# Patient Record
Sex: MALE | Race: OTHER | HISPANIC OR LATINO | Employment: PART TIME | URBAN - METROPOLITAN AREA
[De-identification: names, ages, dates, MRNs, and addresses within clinical notes are randomized per-mention and may not be internally consistent; named-entity substitution may affect disease eponyms.]

---

## 2017-07-01 ENCOUNTER — HOSPITAL ENCOUNTER (EMERGENCY)
Facility: HOSPITAL | Age: 22
Discharge: HOME | End: 2017-07-01
Attending: EMERGENCY MEDICINE | Admitting: EMERGENCY MEDICINE
Payer: COMMERCIAL

## 2017-07-01 VITALS
DIASTOLIC BLOOD PRESSURE: 89 MMHG | WEIGHT: 173 LBS | HEART RATE: 53 BPM | TEMPERATURE: 98.7 F | RESPIRATION RATE: 16 BRPM | SYSTOLIC BLOOD PRESSURE: 151 MMHG | OXYGEN SATURATION: 99 %

## 2017-07-01 DIAGNOSIS — L03.116 LEFT LEG CELLULITIS: Primary | ICD-10-CM

## 2017-07-01 PROCEDURE — 99283 EMERGENCY DEPT VISIT LOW MDM: CPT

## 2017-07-01 RX ORDER — CEPHALEXIN 250 MG/1
500 CAPSULE ORAL ONCE
Status: COMPLETED | OUTPATIENT
Start: 2017-07-01 | End: 2017-07-01

## 2017-07-01 RX ORDER — IBUPROFEN 600 MG/1
600 TABLET ORAL ONCE
Status: COMPLETED | OUTPATIENT
Start: 2017-07-01 | End: 2017-07-01

## 2017-07-01 RX ORDER — IBUPROFEN 600 MG/1
600 TABLET ORAL EVERY 6 HOURS PRN
Qty: 20 TABLET | Refills: 0 | Status: SHIPPED | OUTPATIENT
Start: 2017-07-01

## 2017-07-01 RX ORDER — CEPHALEXIN 500 MG/1
500 CAPSULE ORAL EVERY 6 HOURS SCHEDULED
Qty: 40 CAPSULE | Refills: 0 | Status: SHIPPED | OUTPATIENT
Start: 2017-07-01 | End: 2017-07-11

## 2017-07-01 RX ADMIN — CEPHALEXIN 500 MG: 250 CAPSULE ORAL at 03:56

## 2017-07-01 RX ADMIN — IBUPROFEN 600 MG: 600 TABLET, FILM COATED ORAL at 03:56

## 2017-09-28 ENCOUNTER — OFFICE VISIT (OUTPATIENT)
Dept: URGENT CARE | Facility: MEDICAL CENTER | Age: 22
End: 2017-09-28
Payer: COMMERCIAL

## 2017-09-28 PROCEDURE — 99213 OFFICE O/P EST LOW 20 MIN: CPT

## 2017-10-27 NOTE — PROGRESS NOTES
Assessment  1  Acute bronchitis (466 0) (J20 9)    Plan  Acute bronchitis    · Benzonatate 100 MG Oral Capsule; TAKE 1 CAPSULE 3 TIMES DAILY AS NEEDED   · DrRx Zithromax Z-Alessio 250 MG #6 pill pack; 2 tablets first dose, then 1 tablet daily  for a total of 5 days    Discussion/Summary  Discussion Summary:   Rapid strep test-negative  Patient placed on Zithromax Z-Alessio, Tessalon Perles 100 milligram q 8 hours  I encouraged patient increase fluid intake  Follow-up per primary care provider symptoms worsen  Chief Complaint  Chief Complaint Free Text Note Form: Cold sx, nausea, sore throat, fever and emesis since Tuesday  History of Present Illness  HPI: Patient here with URI symptoms for the last 4-5 days  Describes having a cough which is productive of clear to whitish sputum  Denies shortness of breath  He has been taking over-the-counter DayQuil with minimal improvement along with cough drops and Tylenol cold multi symptom medication  Also describes a 4 day history of sore throat  It is scratchy in nature accompanied by low-grade fever 101 Fahrenheit yesterday  He took some Tylenol for symptoms with mild improvement  Also describes having 1 episode of vomiting diarrhea today  Denies nausea at the present time  However, recently exposed to a co-worker that bronchitis this past week  Hospital Based Practices Required Assessment:   Pain Assessment   the patient states they have pain  (on a scale of 0 to 10, the patient rates the pain at 7 ) Reason DV Screen not done: not alone       Review of Systems  Focused-Male:   Constitutional: fever  ENT: sore throat  Cardiovascular: no complaints of slow or fast heart rate, no chest pain, no palpitations, no leg claudication or lower extremity edema  Respiratory: cough, but-- no shortness of breath  Active Problems  1  Acute URI (465 9) (J06 9)   2  Nausea (787 02) (R11 0)   3  Nausea with vomiting (787 01) (R11 2)   4  Sore throat (462) (J02 9)   5   URTI (acute upper respiratory infection) (465 9) (J06 9)   6  Viral meningitis (047 9) (A87 9)    Past Medical History  1  Acute sinusitis (461 9) (J01 90)   2  History of Acute upper respiratory infection (465 9) (J06 9)   3  History of Previously Well  Active Problems And Past Medical History Reviewed: The active problems and past medical history were reviewed and updated today  Family History  Father    1  No pertinent family history  Family History    2  Family history of cardiac disorder (V17 49) (Z82 49)   3  Family history of Heart Disease (V17 49)    Social History   · Never A Smoker   · No alcohol use   · No drug use  Social History Reviewed: The social history was reviewed and updated today  The social history was reviewed and is unchanged  Surgical History  1  History of Hand Surgery    Current Meds   1  Fluticasone Propionate 50 MCG/ACT Nasal Suspension; Two sprays in each nostril   once daily; Therapy: [id number] to (Last Rx:[id number])  Requested for: 16Jnq6778 Ordered   2  Ondansetron 4 MG Oral Tablet Disintegrating; Place 1 tab under tongue q8hrs prn   nausea; Therapy: 08Whi3165 to (Last Rx:[id number])  Requested for: 93Ptb8114 Ordered  Medication List Reviewed: The medication list was reviewed and updated today  Allergies  1  No Known Drug Allergies    Vitals  Signs   Recorded: 32WGM3911 09:05PM   Temperature: 97 3 F  Heart Rate: 62  Respiration: 18  Systolic: 247  Diastolic: 64  Height: 6 ft 1 in  Weight: 175 lb   BMI Calculated: 23 09  BSA Calculated: 2 03  O2 Saturation: 98  Pain Scale: 7    Physical Exam    Constitutional   General appearance: No acute distress, well appearing and well nourished  Ears, Nose, Mouth, and Throat   External inspection of ears and nose: Normal     Otoscopic examination: Tympanic membrance translucent with normal light reflex  Canals patent without erythema  Nasal mucosa, septum, and turbinates: Abnormal  -- Hypertrophic right turbinates  Oropharynx: Abnormal  -- Cobblestoning observed  Pulmonary   Respiratory effort: No increased work of breathing or signs of respiratory distress  Auscultation of lungs: Abnormal  -- Course breath sounds bilaterally with decreased breath sounds on inspiration  Cardiovascular   Palpation of heart: Normal PMI, no thrills  Auscultation of heart: Normal rate and rhythm, normal S1 and S2, without murmurs  Lymphatic   Palpation of lymph nodes in neck: No lymphadenopathy  Results/Data  Lab Studies Reviewed: Rapid strep test-negative      Message  Return to work or school:   Yue Richards is under my professional care   He was seen in my office on 9/28/2017   He is able to return to work on  10/1/2017       Nisha Perez   Electronically signed by : Siri Goldmann, M D ; Sep 28 2017  9:24PM EST                       (Author)

## 2018-01-09 NOTE — MISCELLANEOUS
Provider Comments  Provider Comments:   NO SHOW APPT - called and spoke with family member  Apologized and will give him the message        Signatures   Electronically signed by : Christopher Bhatt DO; Jan 29 2016  4:55PM EST                       (Review)

## 2018-01-18 NOTE — MISCELLANEOUS
Message  Return to work or school:   Leopoldo Medina is under my professional care  He was seen in my office on 8/28/2016   He is able to return to work on  8/29/2016       Freya Murcia PA-C  Signatures   Electronically signed by : DADA Henderson; Aug 28 2016  1:41PM EST                       (Author)    Electronically signed by : DADA Henderson; Aug 28 2016  1:42PM EST                       (Author)    Electronically signed by : DADA Henderson; Aug 28 2016  1:44PM EST                       (Author)    Electronically signed by : DADA Henderson;  Aug 28 2016  1:45PM EST                       (Author)

## 2018-01-18 NOTE — MISCELLANEOUS
Message  Return to work or school:   Darren Parks is under my professional care   He was seen in my office on 9/28/2017   He is able to return to work on  10/1/2017       Hannah Bazzi   Electronically signed by : BONI Leone ; Sep 28 2017  9:24PM EST                       (Author)

## 2018-03-06 ENCOUNTER — OFFICE VISIT (OUTPATIENT)
Dept: URGENT CARE | Facility: MEDICAL CENTER | Age: 23
End: 2018-03-06
Payer: COMMERCIAL

## 2018-03-06 VITALS
RESPIRATION RATE: 18 BRPM | DIASTOLIC BLOOD PRESSURE: 80 MMHG | TEMPERATURE: 97.5 F | BODY MASS INDEX: 24.6 KG/M2 | OXYGEN SATURATION: 97 % | SYSTOLIC BLOOD PRESSURE: 130 MMHG | HEIGHT: 73 IN | WEIGHT: 185.6 LBS | HEART RATE: 64 BPM

## 2018-03-06 DIAGNOSIS — K52.9 NONINFECTIOUS GASTROENTERITIS, UNSPECIFIED TYPE: Primary | ICD-10-CM

## 2018-03-06 PROCEDURE — 99213 OFFICE O/P EST LOW 20 MIN: CPT | Performed by: FAMILY MEDICINE

## 2018-03-06 RX ORDER — ONDANSETRON 4 MG/1
4 TABLET, ORALLY DISINTEGRATING ORAL EVERY 6 HOURS PRN
Qty: 20 TABLET | Refills: 0 | Status: SHIPPED | OUTPATIENT
Start: 2018-03-06 | End: 2019-03-28

## 2018-03-06 NOTE — PROGRESS NOTES
[facility]        NAME: Aisha Rivera III is a 26 y o  male  : 1995    MRN: [id number]  DATE: 2018  TIME: 12:19 PM    Assessment and Plan   Noninfectious gastroenteritis, unspecified type [K52 9]  1  Noninfectious gastroenteritis, unspecified type  ondansetron (ZOFRAN-ODT) 4 mg disintegrating tablet         Patient Instructions       Follow up with PCP in 3-5 days  Proceed to  ER if symptoms worsen  Chief Complaint     Chief Complaint   Patient presents with    Abdominal Pain     for 3 days  Vomiting and diarrhea yesterday  History of Present Illness       Patient with 3 day history of abdominal pain  Pain is predominantly located in the epigastric area which is mild to moderate in nature  However in the last day he has developed some vomiting least 1 or 2 times and diarrhea which has been loose watery in nature not accompanied by any blood or mucus  He is taking no medication to control diarrhea  However he is taking some Mucinex for mild cold symptoms was consist of nasal congestion and some chest congestion  Denies any fever  Current complaint some fatigue and mild thirst       Abdominal Pain   Associated symptoms include diarrhea and vomiting  Pertinent negatives include no nausea  Review of Systems   Review of Systems   Constitutional: Positive for appetite change  HENT: Positive for congestion  Respiratory: Positive for cough  Gastrointestinal: Positive for abdominal pain, diarrhea and vomiting  Negative for nausea           Current Medications       Current Outpatient Prescriptions:     ibuprofen (MOTRIN) 600 mg tablet, Take 1 tablet by mouth every 6 (six) hours as needed for mild pain, Disp: 20 tablet, Rfl: 0    ondansetron (ZOFRAN-ODT) 4 mg disintegrating tablet, Take 1 tablet (4 mg total) by mouth every 6 (six) hours as needed for nausea or vomiting, Disp: 20 tablet, Rfl: 0    Current Allergies     Allergies as of 2018    (No Known Allergies)            The following portions of the patient's history were reviewed and updated as appropriate: allergies, current medications, past family history, past medical history, past social history, past surgical history and problem list      No past medical history on file  No past surgical history on file  No family history on file  Medications have been verified  Objective   /80 (BP Location: Left arm, Patient Position: Sitting, Cuff Size: Standard)   Pulse 64   Temp 97 5 °F (36 4 °C) (Temporal)   Resp 18   Ht 6' 1" (1 854 m)   Wt 84 2 kg (185 lb 9 6 oz)   SpO2 97%   BMI 24 49 kg/m²        Physical Exam     Physical Exam   Constitutional: He appears well-developed  Neck: Normal range of motion  Neck supple  Cardiovascular: Normal rate and regular rhythm  Pulmonary/Chest: Effort normal and breath sounds normal    Abdominal: Soft  There is tenderness  Mild tenderness on deep palpation midepigastric area with no rebound or guarding appreciated  Bowel sounds-active   Nursing note and vitals reviewed

## 2018-03-06 NOTE — PATIENT INSTRUCTIONS
I prescribed Zofran 4 mg ODT q 6h hours as needed for nausea  I advised the patient to maintain a clear liquid diet before advancing diet  He is to increase fluid intake  Recommend he use over-the-counter Pepto-Bismol or Imodium if diarrhea persists or worsens  Follow-up per primary care provider symptoms worsen  Acute Nausea and Vomiting   WHAT YOU NEED TO KNOW:   Acute nausea and vomiting start suddenly, worsen quickly, and last a short time  DISCHARGE INSTRUCTIONS:   Return to the emergency department if:   · You see blood in your vomit or your bowel movements  · You have sudden, severe pain in your chest and upper abdomen after hard vomiting or retching  · You have swelling in your neck and chest      · You are dizzy, cold, and thirsty and your eyes and mouth are dry  · You are urinating very little or not at all  · You have muscle weakness, leg cramps, and trouble breathing  · Your heart is beating much faster than normal      · You continue to vomit for more than 48 hours  Contact your healthcare provider if:   · You have frequent dry heaves (vomiting but nothing comes out)  · Your nausea and vomiting does not get better or go away after you use medicine  · You have questions or concerns about your condition or treatment  Medicines: You may need any of the following:  · Medicines  may be given to calm your stomach and stop your vomiting  You may also need medicines to help you feel more relaxed or to stop nausea and vomiting caused by motion sickness  · Gastrointestinal stimulants  are used to help empty your stomach and bowels  This may help decrease nausea and vomiting  · Take your medicine as directed  Contact your healthcare provider if you think your medicine is not helping or if you have side effects  Tell him or her if you are allergic to any medicine  Keep a list of the medicines, vitamins, and herbs you take   Include the amounts, and when and why you take them  Bring the list or the pill bottles to follow-up visits  Carry your medicine list with you in case of an emergency  Prevent or manage acute nausea and vomiting:   · Do not drink alcohol  Alcohol may upset or irritate your stomach  Too much alcohol can also cause acute nausea and vomiting  · Control stress  Headaches due to stress may cause nausea and vomiting  Find ways to relax and manage your stress  Get more rest and sleep  · Drink more liquids as directed  Vomiting can lead to dehydration  It is important to drink more liquids to help replace lost body fluids  Ask your healthcare provider how much liquid to drink each day and which liquids are best for you  Your provider may recommend that you drink an oral rehydration solution (ORS)  ORS contains water, salts, and sugar that are needed to replace the lost body fluids  Ask what kind of ORS to use, how much to drink, and where to get it  · Eat smaller meals, more often  Eat small amounts of food every 2 to 3 hours, even if you are not hungry  Food in your stomach may decrease your nausea  · Talk to your healthcare provider before you take over-the-counter (OTC) medicines  These medicines can cause serious problems if you use certain other medicines, or you have a medical condition  You may have problems if you use too much or use them for longer than the label says  Follow directions on the label carefully  Follow up with your healthcare provider as directed:  Write down your questions so you remember to ask them during your follow-up visits  © 2017 [facility] [place] [employer] Information is for End User's use only and may not be sold, redistributed or otherwise used for commercial purposes  All illustrations and images included in CareNotes® are the copyrighted property of [email or web address] , [employer]  or [employer]  The above information is an  only   It is not intended as medical advice for individual conditions or treatments  Talk to your doctor, nurse or pharmacist before following any medical regimen to see if it is safe and effective for you

## 2018-03-06 NOTE — LETTER
March 6, 2018     Patient: Keyla Paul III   YOB: 1995   Date of Visit: 3/6/2018       To Whom it May Concern:    Keyla Paul III was seen in my clinic on 3/6/2018  He may return to school on 3/8/2018  If you have any questions or concerns, please don't hesitate to call           Sincerely,          Hortencia Padron MD        CC: No Recipients

## 2018-08-11 ENCOUNTER — OFFICE VISIT (OUTPATIENT)
Dept: URGENT CARE | Facility: MEDICAL CENTER | Age: 23
End: 2018-08-11
Payer: COMMERCIAL

## 2018-08-11 VITALS
SYSTOLIC BLOOD PRESSURE: 133 MMHG | DIASTOLIC BLOOD PRESSURE: 80 MMHG | HEIGHT: 73 IN | WEIGHT: 177 LBS | TEMPERATURE: 97.8 F | OXYGEN SATURATION: 98 % | HEART RATE: 60 BPM | RESPIRATION RATE: 16 BRPM | BODY MASS INDEX: 23.46 KG/M2
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
## 2018-08-11 PROCEDURE — 99213 OFFICE O/P EST LOW 20 MIN: CPT | Performed by: FAMILY MEDICINE

## 2018-08-11 RX ORDER — MOMETASONE FUROATE 1 MG/G
CREAM TOPICAL DAILY
Qty: 15 G | Refills: 0 | Status: SHIPPED | OUTPATIENT
Start: 2018-08-11 | End: 2019-03-28

## 2018-08-11 NOTE — PATIENT INSTRUCTIONS
I prescribed generic Elocon cream to be applied to affected eye at least once a day  May continue ice application  Patient is to follow-up per primary care provider redness or swelling worsens  Insect Bite or Sting   WHAT YOU NEED TO KNOW:   Most insect bites and stings are not dangerous and go away without treatment  Your symptoms may be mild, or you may develop anaphylaxis  Anaphylaxis is a sudden, life-threatening reaction that needs immediate treatment  Common examples of insects that bite or sting are bees, ticks, mosquitoes, spiders, and ants  Insect bites or stings can lead to diseases such as malaria, West Nile virus, Lyme disease, or [place] Spotted Fever  DISCHARGE INSTRUCTIONS:   Call 911 for signs or symptoms of anaphylaxis,  such as trouble breathing, swelling in your mouth or throat, or wheezing  You may also have itching, a rash, hives, or feel like you are going to faint  Return to the emergency department if:   · You are stung on your tongue or in your throat  · A white area forms around the bite  · You are sweating badly or have body pain  · You think you were bitten or stung by a poisonous insect  Contact your healthcare provider if:   · You have a fever  · The area becomes red, warm, tender, and swollen beyond the area of the bite or sting  · You have questions or concerns about your condition or care  Medicines:   · Antihistamines  decrease itching and rash  · Epinephrine  is used to treat severe allergic reactions such as anaphylaxis  · Take your medicine as directed  Contact your healthcare provider if you think your medicine is not helping or if you have side effects  Tell him of her if you are allergic to any medicine  Keep a list of the medicines, vitamins, and herbs you take  Include the amounts, and when and why you take them  Bring the list or the pill bottles to follow-up visits  Carry your medicine list with you in case of an emergency    Steps to take for signs or symptoms of anaphylaxis:   · Immediately  give 1 shot of epinephrine only into the outer thigh muscle  · Leave the shot in place  as directed  Your healthcare provider may recommend you leave it in place for up to 10 seconds before you remove it  This helps make sure all of the epinephrine is delivered  · Call 911 and go to the emergency department,  even if the shot improved symptoms  Do not drive yourself  Bring the used epinephrine shot with you  Safety precautions to take if you are at risk for anaphylaxis:   · Keep 2 shots of epinephrine with you at all times  You may need a second shot, because epinephrine only works for about 20 minutes and symptoms may return  Your healthcare provider can show you and family members how to give the shot  Check the expiration date every month and replace it before it expires  · Create an action plan  Your healthcare provider can help you create a written plan that explains the allergy and an emergency plan to treat a reaction  The plan explains when to give a second epinephrine shot if symptoms return or do not improve after the first  Give copies of the action plan and emergency instructions to family members, work and school staff, and  providers  Show them how to give a shot of epinephrine  · Carry medical alert identification  Wear medical alert jewelry or carry a card that says you have an insect allergy  Ask your healthcare provider where to get these items  If an insect bites or stings you:   · Remove the stinger  Scrape the stinger out with your fingernail, edge of a credit card, or a knife blade  Do not squeeze the wound  Gently wash the area with soap and water  · Remove the tick  Ticks must be removed as soon as possible so you do not get diseases passed through tick bites  Ask your healthcare provider for more information on tick bites and how to remove ticks    Care for a bite or sting wound:   · Elevate the affected area  Prop the wound above the level of your heart, if possible  Elevate the area for 10 to 20 minutes each hour or as directed by your healthcare provider  · Use compresses  Soak a clean washcloth in cold water, wring it out, and put it on the bite or sting  Use the compress for 10 to 20 minutes each hour or as directed by your healthcare provider  After 24 to 48 hours, change to warm compresses  · Apply a paste  Add water to baking soda to make a thick paste  Put the paste on the area for 5 minutes  Rinse gently to remove the paste  Prevent another insect bite or sting:   · Do not wear bright-colored or flower-print clothing when you plan to spend time outdoors  Do not use hairspray, perfumes, or aftershave  · Do not leave food out  · Empty any standing water and wash container with soap and water every 2 days  · Put screens on all open windows and doors  · Put insect repellent that contains DEET on skin that is showing when you go outside  Put insect repellent at the top of your boots, bottom of pant legs, and sleeve cuffs  Wear long sleeves, pants, and shoes  · Use citronella candles outdoors to help keep mosquitoes away  Put a tick and flea collar on pets  Follow up with your healthcare provider as directed:  Write down your questions so you remember to ask them during your visits  © 2017 [address] Information is for End User's use only and may not be sold, redistributed or otherwise used for commercial purposes  All illustrations and images included in CareNotes® are the copyrighted property of [employer]  or [address]  The above information is an  only  It is not intended as medical advice for individual conditions or treatments  Talk to your doctor, nurse or pharmacist before following any medical regimen to see if it is safe and effective for you

## 2018-08-11 NOTE — PROGRESS NOTES
[facility]        NAME: Sharona Mehta III is a 26 y o  male  : 1995    MRN: [id number]  DATE: 2018  TIME: 11:32 AM    Assessment and Plan   Insect bite, initial encounter [W57  XXXA]  1  Insect bite, initial encounter  mometasone (ELOCON) 0 1 % cream         Patient Instructions       Follow up with PCP in 3-5 days  Proceed to  ER if symptoms worsen  Chief Complaint     Chief Complaint   Patient presents with    Eye Pain     insect bit 4 days - has redness and itching around the eye area          History of Present Illness       Patient complaining of insect bite underneath his right eye which occurred approximately 4 days ago  Not quite sure what bit him  However 3 days ago noticed some redness and swelling underneath his right eyelid  Describes minimal itching  He has been applying ice with no noticeable improvement  Denies fever  Denies eye pain  Review of Systems   Review of Systems   Constitutional: Negative  Skin: Positive for rash  All other systems reviewed and are negative  Current Medications       Current Outpatient Prescriptions:     ibuprofen (MOTRIN) 600 mg tablet, Take 1 tablet by mouth every 6 (six) hours as needed for mild pain, Disp: 20 tablet, Rfl: 0    mometasone (ELOCON) 0 1 % cream, Apply topically daily for 7 days, Disp: 15 g, Rfl: 0    ondansetron (ZOFRAN-ODT) 4 mg disintegrating tablet, Take 1 tablet (4 mg total) by mouth every 6 (six) hours as needed for nausea or vomiting, Disp: 20 tablet, Rfl: 0    Current Allergies     Allergies as of 2018    (No Known Allergies)            The following portions of the patient's history were reviewed and updated as appropriate: allergies, current medications, past family history, past medical history, past social history, past surgical history and problem list      No past medical history on file  No past surgical history on file  No family history on file        Medications have been verified  Objective   /80 (BP Location: Left arm, Patient Position: Sitting, Cuff Size: Standard)   Pulse 60   Temp 97 8 °F (36 6 °C)   Resp 16   Ht 6' 1" (1 854 m)   Wt 80 3 kg (177 lb)   SpO2 98%   BMI 23 35 kg/m²        Physical Exam     Physical Exam   Skin:   Right lower eyelid reveals some erythema  Area of erythema measures approximately 1 x 2 cm  There is no induration  Nontender to touch  Nursing note and vitals reviewed

## 2018-08-11 NOTE — LETTER
August 11, 2018     Patient: Madeline Preston III   YOB: 1995   Date of Visit: 8/11/2018       To Whom It May Concern: It is my medical opinion that Madeline Preston III may return to work on 8/11/2018  If you have any questions or concerns, please don't hesitate to call           Sincerely,        Otto Tomas MD    CC: No Recipients

## 2019-03-28 ENCOUNTER — OFFICE VISIT (OUTPATIENT)
Dept: FAMILY MEDICINE CLINIC | Facility: CLINIC | Age: 24
End: 2019-03-28
Payer: COMMERCIAL

## 2019-03-28 VITALS
HEIGHT: 73 IN | WEIGHT: 182 LBS | TEMPERATURE: 98.2 F | SYSTOLIC BLOOD PRESSURE: 128 MMHG | RESPIRATION RATE: 18 BRPM | HEART RATE: 62 BPM | BODY MASS INDEX: 24.12 KG/M2 | DIASTOLIC BLOOD PRESSURE: 76 MMHG

## 2019-03-28 DIAGNOSIS — R06.83 SNORING: ICD-10-CM

## 2019-03-28 DIAGNOSIS — G47.9 SLEEPING DIFFICULTY: ICD-10-CM

## 2019-03-28 DIAGNOSIS — R06.81 WITNESSED APNEIC SPELLS: ICD-10-CM

## 2019-03-28 DIAGNOSIS — Z80.8 FAMILY HISTORY OF THYROID CANCER: ICD-10-CM

## 2019-03-28 DIAGNOSIS — Z13.6 SCREENING FOR CARDIOVASCULAR CONDITION: ICD-10-CM

## 2019-03-28 DIAGNOSIS — Z00.00 ANNUAL PHYSICAL EXAM: Primary | ICD-10-CM

## 2019-03-28 DIAGNOSIS — Z23 NEED FOR VACCINATION: ICD-10-CM

## 2019-03-28 PROCEDURE — 90471 IMMUNIZATION ADMIN: CPT

## 2019-03-28 PROCEDURE — 90715 TDAP VACCINE 7 YRS/> IM: CPT

## 2019-03-28 PROCEDURE — 99385 PREV VISIT NEW AGE 18-39: CPT | Performed by: NURSE PRACTITIONER

## 2019-03-28 RX ORDER — MULTIVITAMIN
1 TABLET ORAL DAILY
COMMUNITY

## 2019-03-28 NOTE — PROGRESS NOTES
[facility] HEALTH MAINTENANCE OFFICE VISIT  [facility]    NAME: Zackery Contreras III  AGE: 27 y o  SEX: male  : 1995     DATE: 3/28/2019    Assessment and Plan         Will get blood work done and will call with results  Diagnoses and all orders for this visit:    Annual physical exam  -     Comprehensive metabolic panel; Future  -     CBC and differential; Future  -     Lipid Panel with Direct LDL reflex; Future  -     TSH, 3rd generation with Free T4 reflex; Future    Snoring  -     Comprehensive metabolic panel; Future  -     CBC and differential; Future  -     Lipid Panel with Direct LDL reflex; Future  -     TSH, 3rd generation with Free T4 reflex; Future  -     Ambulatory referral to Pulmonology; Future  -     Ambulatory referral to Sleep Medicine; Future    Sleeping difficulty  -     Comprehensive metabolic panel; Future  -     CBC and differential; Future  -     Lipid Panel with Direct LDL reflex; Future  -     TSH, 3rd generation with Free T4 reflex; Future  -     Ambulatory referral to Pulmonology; Future  -     Ambulatory referral to Sleep Medicine; Future    Witnessed apneic spells  -     Comprehensive metabolic panel; Future  -     CBC and differential; Future  -     Lipid Panel with Direct LDL reflex; Future  -     TSH, 3rd generation with Free T4 reflex; Future  -     Ambulatory referral to Pulmonology; Future  -     Ambulatory referral to Sleep Medicine; Future    Screening for cardiovascular condition  -     Comprehensive metabolic panel; Future  -     Lipid Panel with Direct LDL reflex; Future    Family history of thyroid cancer  -     Comprehensive metabolic panel; Future  -     CBC and differential; Future  -     Lipid Panel with Direct LDL reflex; Future  -     TSH, 3rd generation with Free T4 reflex;  Future    Need for vaccination  -     TDAP VACCINE GREATER THAN OR EQUAL TO 8YO IM    Other orders  -     Multiple Vitamin (MULTIVITAMIN) tablet; Take 1 tablet by mouth daily            · Patient Counseling:   · Nutrition: Stressed importance of a well balanced diet, moderation of sodium/saturated fat, caloric balance and sufficient intake of fiber  · Exercise: Stressed the importance of regular exercise with a goal of 150 minutes per week  · Dental Health: Discussed daily flossing and brushing and regular dental visits   · Sexuality: Discussed sexually transmitted infections, use of condoms and prevention of unintended pregnancy  · Alcohol Use:  Recommended moderation of alcohol intake  · Injury Prevention: Discussed Safety Belts, Safety Helmets, and Smoke Detectors    · Immunizations reviewed and administered  · Discussed benefits of screening of self testicular exam and skin exam  BMI Counseling: Body mass index is 24 01 kg/m²  Discussed with patient's BMI with him  Return in about 1 year (around 3/28/2020) for Annual physical         Chief Complaint     Chief Complaint   Patient presents with    Physical Exam     klpn    sleep issues       History of Present Illness     HPI    Well Adult Physical   Patient here for a comprehensive physical exam   New to practice  Personal and family medical history reviewed  Stated that having sleeping issues from while where he does not sleep for 24 to 48 hours and then crashes              Diet and Physical Activity  Diet: well balanced diet  Weight concerns: None, patient's BMI is between 18 5-24 9  Exercise: daily      Depression Screen  PHQ-9 Depression Screening    PHQ-9:    Frequency of the following problems over the past two weeks:       Little interest or pleasure in doing things:  0 - not at all  Feeling down, depressed, or hopeless:  0 - not at all  PHQ-2 Score:  0          General Health  Hearing: Normal:  bilateral  Vision: no vision problems, most recent eye exam >1 year and wears contacts  Dental: regular dental visits    Reproductive Health  Sexually active with female partners and uses OCP and condoms      The following portions of the patient's history were reviewed and updated as appropriate: allergies, current medications, past family history, past medical history, past social history, past surgical history and problem list     Review of Systems     Review of Systems   Constitutional: Negative  HENT: Negative  Eyes: Negative  Respiratory: Negative for cough, choking, chest tightness, shortness of breath, wheezing and stridor  As noted in HPI     Cardiovascular: Negative  Gastrointestinal: Negative  Endocrine: Negative  Genitourinary: Negative  Musculoskeletal: Negative  Skin: Negative  Allergic/Immunologic: Negative  Neurological: Negative  Hematological: Negative  Psychiatric/Behavioral: Positive for sleep disturbance  Negative for agitation, behavioral problems, confusion, decreased concentration, dysphoric mood, hallucinations, self-injury and suicidal ideas  The patient is not nervous/anxious and is not hyperactive          Past Medical History     Past Medical History:   Diagnosis Date    Medical history non-contributory        Past Surgical History     Past Surgical History:   Procedure Laterality Date    HAND SURGERY      NO PAST SURGERIES         Social History     Social History     Socioeconomic History    Marital status: Single     Spouse name: None    Number of children: None    Years of education: None    Highest education level: None   Occupational History    None   Social Needs    Financial resource strain: None    Food insecurity:     Worry: None     Inability: None    Transportation needs:     Medical: None     Non-medical: None   Tobacco Use    Smoking status: Never Smoker    Smokeless tobacco: Never Used   Substance and Sexual Activity    Alcohol use: Yes     Frequency: 2-3 times a week     Drinks per session: 1 or 2     Binge frequency: Never    Drug use: No    Sexual activity: None   Lifestyle    Physical activity: Days per week: None     Minutes per session: None    Stress: None   Relationships    Social connections:     Talks on phone: None     Gets together: None     Attends Holiness service: None     Active member of club or organization: None     Attends meetings of clubs or organizations: None     Relationship status: None    Intimate partner violence:     Fear of current or ex partner: None     Emotionally abused: None     Physically abused: None     Forced sexual activity: None   Other Topics Concern    None   Social History Narrative    None       Family History     Family History   Problem Relation Age of Onset    No Known Problems Father     Heart disease Family     Thyroid cancer Sister     Heart disease Maternal Grandmother     Heart disease Maternal Grandfather        Current Medications       Current Outpatient Medications:     ibuprofen (MOTRIN) 600 mg tablet, Take 1 tablet by mouth every 6 (six) hours as needed for mild pain, Disp: 20 tablet, Rfl: 0    Multiple Vitamin (MULTIVITAMIN) tablet, Take 1 tablet by mouth daily, Disp: , Rfl:      Allergies     No Known Allergies    Objective     /76   Pulse 62   Temp 98 2 °F (36 8 °C)   Resp 18   Ht 6' 1" (1 854 m)   Wt 82 6 kg (182 lb)   BMI 24 01 kg/m²      Physical Exam   Constitutional: He is oriented to person, place, and time  He appears well-developed and well-nourished  HENT:   Head: Normocephalic  Right Ear: Tympanic membrane, external ear and ear canal normal    Left Ear: Tympanic membrane, external ear and ear canal normal    Nose: Nose normal  Right sinus exhibits no maxillary sinus tenderness and no frontal sinus tenderness  Left sinus exhibits no maxillary sinus tenderness and no frontal sinus tenderness  Mouth/Throat: Oropharynx is clear and moist and mucous membranes are normal    Eyes: Pupils are equal, round, and reactive to light  Conjunctivae and lids are normal    Neck: Normal range of motion  Neck supple  Cardiovascular: Normal rate, regular rhythm and normal heart sounds  Pulmonary/Chest: Effort normal and breath sounds normal    Abdominal: Soft  Normal appearance and bowel sounds are normal  There is no hepatomegaly  There is no tenderness  There is no rebound and no CVA tenderness  Hernia confirmed negative in the right inguinal area and confirmed negative in the left inguinal area  Genitourinary:   Genitourinary Comments: Refused  exam   Musculoskeletal: Normal range of motion  He exhibits no edema, tenderness or deformity  Lymphadenopathy:        Right cervical: No superficial cervical and no posterior cervical adenopathy present  Left cervical: No superficial cervical and no posterior cervical adenopathy present  Right: No inguinal and no supraclavicular adenopathy present  Left: No inguinal and no supraclavicular adenopathy present  Neurological: He is alert and oriented to person, place, and time  He has normal strength and normal reflexes  No sensory deficit  Coordination and gait normal  GCS eye subscore is 4  GCS verbal subscore is 5  GCS motor subscore is 6  Skin: Skin is warm, dry and intact  Psychiatric: He has a normal mood and affect  His speech is normal and behavior is normal  Judgment and thought content normal  Cognition and memory are normal    Vitals reviewed           Visual Acuity Screening    Right eye Left eye Both eyes   Without correction:      With correction: 20/15 20/20 20/13       Health Maintenance     Health Maintenance   Topic Date Due    Depression Screening PHQ  03/28/2020    BMI: Adult  03/28/2020    DTaP,Tdap,and Td Vaccines (7 - Td) 03/28/2029    INFLUENZA VACCINE  Completed    HEPATITIS B VACCINES  Completed     Immunization History   Administered Date(s) Administered    DTaP 5 1997, 03/25/1999, 06/17/2002, 07/28/2003    H1N1, All Formulations 10/23/2009    HPV Quadrivalent 05/08/2014    Hep B, adult 06/17/2002, 07/23/2002, 02/01/2003    INFLUENZA 01/05/2019    IPV 1997, 03/25/1999, 06/17/2002, 07/28/2003    Influenza TIV (IM) 11/14/2008, 10/20/2009    MMR 06/17/2002, 11/20/2002    Meningococcal, Unknown Serogroups 11/14/2008, 05/08/2014    Tdap 11/14/2008, 03/28/2019    Varicella 06/17/2002, 11/14/2008       [facility], [address] [facility]

## 2019-03-28 NOTE — PATIENT INSTRUCTIONS
Wellness Visit for Adults   WHAT YOU NEED TO KNOW:   What is a wellness visit? A wellness visit is when you see your healthcare provider to get screened for health problems  You can also get advice on how to stay healthy  Write down your questions so you remember to ask them  Ask your healthcare provider how often you should have a wellness visit  What happens at a wellness visit? Your healthcare provider will ask about your health, and your family history of health problems  This includes high blood pressure, heart disease, and cancer  He or she will ask if you have symptoms that concern you, if you smoke, and about your mood  You may also be asked about your intake of medicines, supplements, food, and alcohol  Any of the following may be done:  · Your weight  will be checked  Your height may also be checked so your body mass index (BMI) can be calculated  Your BMI shows if you are at a healthy weight  · Your blood pressure  and heart rate will be checked  Your temperature may also be checked  · Blood and urine tests  may be done  Blood tests may be done to check your cholesterol levels  Abnormal cholesterol levels increase your risk for heart disease and stroke  You may also need a blood or urine test to check for diabetes if you are at increased risk  Urine tests may be done to look for signs of an infection or kidney disease  · A physical exam  includes checking your heartbeat and lungs with a stethoscope  Your healthcare provider may also check your skin to look for sun damage  · Screening tests  may be recommended  A screening test is done to check for diseases that may not cause symptoms  The screening tests you may need depend on your age, gender, family history, and lifestyle habits  For example, colorectal screening may be recommended if you are 48years old or older  What screening tests do I need if I am a woman? · A Pap smear  is used to screen for cervical cancer   Pap smears are usually done every 3 to 5 years depending on your age  You may need them more often if you have had abnormal Pap smear test results in the past  Ask your healthcare provider how often you should have a Pap smear  · A mammogram  is an x-ray of your breasts to screen for breast cancer  Experts recommend mammograms every 2 years starting at age 48 years  You may need a mammogram at age 52 years or younger if you have an increased risk for breast cancer  Talk to your healthcare provider about when you should start having mammograms and how often you need them  What vaccines might I need? · Get an influenza vaccine  every year  The influenza vaccine protects you from the flu  Several types of viruses cause the flu  The viruses change over time, so new vaccines are made each year  · Get a tetanus-diphtheria (Td) booster vaccine  every 10 years  This vaccine protects you against tetanus and diphtheria  Tetanus is a severe infection that may cause painful muscle spasms and lockjaw  Diphtheria is a severe bacterial infection that causes a thick covering in the back of your mouth and throat  · Get a human papillomavirus (HPV) vaccine  if you are female and aged 23 to 32 or male 23 to 24 and never received it  This vaccine protects you from HPV infection  HPV is the most common infection spread by sexual contact  HPV may also cause vaginal, penile, and anal cancers  · Get a pneumococcal vaccine  if you are aged 72 years or older  The pneumococcal vaccine is an injection given to protect you from pneumococcal disease  Pneumococcal disease is an infection caused by pneumococcal bacteria  The infection may cause pneumonia, meningitis, or an ear infection  · Get a shingles vaccine  if you are aged 61 or older, even if you have had shingles before  The shingles vaccine is an injection to protect you from the varicella-zoster virus  This is the same virus that causes chickenpox   Shingles is a painful rash that develops in people who had chickenpox or have been exposed to the virus  How can I eat healthy? My Plate is a model for planning healthy meals  It shows the types and amounts of foods that should go on your plate  Fruits and vegetables make up about half of your plate, and grains and protein make up the other half  A serving of dairy is included on the side of your plate  The amount of calories and serving sizes you need depends on your age, gender, weight, and height  Examples of healthy foods are listed below:  · Eat a variety of vegetables  such as dark green, red, and orange vegetables  You can also include canned vegetables low in sodium (salt) and frozen vegetables without added butter or sauces  · Eat a variety of fresh fruits , canned fruit in 100% juice, frozen fruit, and dried fruit  · Include whole grains  At least half of the grains you eat should be whole grains  Examples include whole-wheat bread, wheat pasta, brown rice, and whole-grain cereals such as oatmeal     · Eat a variety of protein foods such as seafood (fish and shellfish), lean meat, and poultry without skin (turkey and chicken)  Examples of lean meats include pork leg, shoulder, or tenderloin, and beef round, sirloin, tenderloin, and extra lean ground beef  Other protein foods include eggs and egg substitutes, beans, peas, soy products, nuts, and seeds  · Choose low-fat dairy products such as skim or 1% milk or low-fat yogurt, cheese, and cottage cheese  · Limit unhealthy fats  such as butter, hard margarine, and shortening  How much exercise do I need? Exercise at least 30 minutes per day on most days of the week  Some examples of exercise include walking, biking, dancing, and swimming  You can also fit in more physical activity by taking the stairs instead of the elevator or parking farther away from stores  Include muscle strengthening activities 2 days each week  Regular exercise provides many health benefits  It helps you manage your weight, and decreases your risk for type 2 diabetes, heart disease, stroke, and high blood pressure  Exercise can also help improve your mood  Ask your healthcare provider about the best exercise plan for you  What are some general health and safety guidelines I should follow? · Do not smoke  Nicotine and other chemicals in cigarettes and cigars can cause lung damage  Ask your healthcare provider for information if you currently smoke and need help to quit  E-cigarettes or smokeless tobacco still contain nicotine  Talk to your healthcare provider before you use these products  · Limit alcohol  A drink of alcohol is 12 ounces of beer, 5 ounces of wine, or 1½ ounces of liquor  · Lose weight, if needed  Being overweight increases your risk of certain health conditions  These include heart disease, high blood pressure, type 2 diabetes, and certain types of cancer  · Protect your skin  Do not sunbathe or use tanning beds  Use sunscreen with a SPF 15 or higher  Apply sunscreen at least 15 minutes before you go outside  Reapply sunscreen every 2 hours  Wear protective clothing, hats, and sunglasses when you are outside  · Drive safely  Always wear your seatbelt  Make sure everyone in your car wears a seatbelt  A seatbelt can save your life if you are in an accident  Do not use your cell phone when you are driving  This could distract you and cause an accident  Pull over if you need to make a call or send a text message  · Practice safe sex  Use latex condoms if are sexually active and have more than one partner  Your healthcare provider may recommend screening tests for sexually transmitted infections (STIs)  · Wear helmets, lifejackets, and protective gear  Always wear a helmet when you ride a bike or motorcycle, go skiing, or play sports that could cause a head injury  Wear protective equipment when you play sports   Wear a lifejacket when you are on a boat or doing water sports  CARE AGREEMENT:   You have the right to help plan your care  Learn about your health condition and how it may be treated  Discuss treatment options with your caregivers to decide what care you want to receive  You always have the right to refuse treatment  The above information is an  only  It is not intended as medical advice for individual conditions or treatments  Talk to your doctor, nurse or pharmacist before following any medical regimen to see if it is safe and effective for you  © 2017 [employer] [employer] Information is for End User's use only and may not be sold, redistributed or otherwise used for commercial purposes  All illustrations and images included in CareNotes® are the copyrighted property of [employer]  or [employer]

## 2020-01-16 ENCOUNTER — APPOINTMENT (OUTPATIENT)
Dept: RADIOLOGY | Facility: MEDICAL CENTER | Age: 25
End: 2020-01-16
Payer: COMMERCIAL

## 2020-01-16 ENCOUNTER — OFFICE VISIT (OUTPATIENT)
Dept: URGENT CARE | Facility: MEDICAL CENTER | Age: 25
End: 2020-01-16
Payer: COMMERCIAL

## 2020-01-16 VITALS
RESPIRATION RATE: 18 BRPM | DIASTOLIC BLOOD PRESSURE: 78 MMHG | HEIGHT: 73 IN | TEMPERATURE: 98.5 F | OXYGEN SATURATION: 96 % | BODY MASS INDEX: 25.22 KG/M2 | SYSTOLIC BLOOD PRESSURE: 137 MMHG | HEART RATE: 70 BPM | WEIGHT: 190.26 LBS

## 2020-01-16 DIAGNOSIS — M25.511 RIGHT SHOULDER PAIN, UNSPECIFIED CHRONICITY: ICD-10-CM

## 2020-01-16 DIAGNOSIS — M25.511 RIGHT SHOULDER PAIN, UNSPECIFIED CHRONICITY: Primary | ICD-10-CM

## 2020-01-16 PROCEDURE — 73030 X-RAY EXAM OF SHOULDER: CPT

## 2020-01-16 PROCEDURE — 99213 OFFICE O/P EST LOW 20 MIN: CPT | Performed by: FAMILY MEDICINE

## 2020-01-17 ENCOUNTER — OFFICE VISIT (OUTPATIENT)
Dept: OBGYN CLINIC | Facility: MEDICAL CENTER | Age: 25
End: 2020-01-17
Payer: COMMERCIAL

## 2020-01-17 VITALS
HEIGHT: 73 IN | BODY MASS INDEX: 24.52 KG/M2 | DIASTOLIC BLOOD PRESSURE: 82 MMHG | WEIGHT: 185 LBS | SYSTOLIC BLOOD PRESSURE: 153 MMHG | HEART RATE: 61 BPM

## 2020-01-17 DIAGNOSIS — M25.511 RIGHT SHOULDER PAIN, UNSPECIFIED CHRONICITY: ICD-10-CM

## 2020-01-17 DIAGNOSIS — S46.911A STRAIN OF ACROMIOCLAVICULAR JOINT, RIGHT, INITIAL ENCOUNTER: Primary | ICD-10-CM

## 2020-01-17 PROCEDURE — 99203 OFFICE O/P NEW LOW 30 MIN: CPT | Performed by: EMERGENCY MEDICINE

## 2020-01-17 NOTE — PROGRESS NOTES
Assessment/Plan:    Diagnoses and all orders for this visit:    Strain of acromioclavicular joint, right, initial encounter  -     Ambulatory referral to Physical Therapy; Future    Right shoulder pain, unspecified chronicity  -     Ambulatory referral to Sports Medicine  -     Ambulatory referral to Physical Therapy; Future      I believe the patient has sustained sprain of the [facility] joint based on history and physical exam   However there is some concern for possible labral involvement  Will recommend physical therapy continue ice and NSAIDs  Patient does live in [place] working for [employer] and is a will be returning there shortly  He will be coming back up to [place] in February for re-evaluation  To consider MR arthrogram if continued symptoms  We have provided a note for     Return in about 4 weeks (around 2/14/2020)  Chief Complaint:     Chief Complaint   Patient presents with    Right Shoulder - Pain       Subjective:   Patient ID: Sean Roche III is a 27 y o  male  NP presents for several months of right shoulder sharp pain superior over the trap worse with reaching and lifting  Occurring after falling while running landing on outstretched arms  The patient is in the [employer] [facility] and has been having difficulty of the right shoulder with his exercise and responsibilities  He is scheduled to go to training in March for which she will be required to do in exercise assessment test including running sit ups and pushups  Pain ranges from 1-8/10  Denies any sense of instability but while doing pushups does experience some popping of the shoulder sometimes painful  Denies any numbness tingling  Review of Systems   Constitutional: Negative for chills and fever  HENT: Negative for sore throat and trouble swallowing  Eyes: Negative for pain and discharge  Respiratory: Negative for choking and shortness of breath      Cardiovascular: Negative for chest pain and palpitations  Gastrointestinal: Negative for abdominal pain and vomiting  Endocrine: Negative for cold intolerance and heat intolerance  Musculoskeletal: Positive for arthralgias  Neurological: Negative for dizziness and weakness  Psychiatric/Behavioral: Negative for self-injury and suicidal ideas  The following portions of the patient's chart were reviewed and updated as appropriate:    Allergy:  No Known Allergies      Past Medical History:   Diagnosis Date    Medical history non-contributory        Past Surgical History:   Procedure Laterality Date    HAND SURGERY      NO PAST SURGERIES         Social History     Socioeconomic History    Marital status: Single     Spouse name: Not on file    Number of children: Not on file    Years of education: Not on file    Highest education level: Not on file   Occupational History    Not on file   Social Needs    Financial resource strain: Not on file    Food insecurity:     Worry: Not on file     Inability: Not on file    Transportation needs:     Medical: Not on file     Non-medical: Not on file   Tobacco Use    Smoking status: Never Smoker    Smokeless tobacco: Never Used   Substance and Sexual Activity    Alcohol use: Not Currently     Frequency: 2-3 times a week     Drinks per session: 1 or 2     Binge frequency: Never    Drug use: No    Sexual activity: Not on file   Lifestyle    Physical activity:     Days per week: Not on file     Minutes per session: Not on file    Stress: Not on file   Relationships    Social connections:     Talks on phone: Not on file     Gets together: Not on file     Attends Christianity service: Not on file     Active member of club or organization: Not on file     Attends meetings of clubs or organizations: Not on file     Relationship status: Not on file    Intimate partner violence:     Fear of current or ex partner: Not on file     Emotionally abused: Not on file     Physically abused: Not on file     Forced sexual activity: Not on file   Other Topics Concern    Not on file   Social History Narrative    Not on file       Family History   Problem Relation Age of Onset    No Known Problems Father     Heart disease Family     Thyroid cancer Sister     Heart disease Maternal Grandmother     Heart disease Maternal Grandfather        Medications:    Current Outpatient Medications:     ibuprofen (MOTRIN) 600 mg tablet, Take 1 tablet by mouth every 6 (six) hours as needed for mild pain, Disp: 20 tablet, Rfl: 0    Multiple Vitamin (MULTIVITAMIN) tablet, Take 1 tablet by mouth daily, Disp: , Rfl:     Patient Active Problem List   Diagnosis    Viral meningitis       Objective:  /82   Pulse 61   Ht 6' 1" (1 854 m)   Wt 83 9 kg (185 lb)   BMI 24 41 kg/m²     Right Shoulder Exam     Tenderness   The patient is experiencing tenderness in the acromioclavicular joint  Range of Motion   Active abduction: abnormal   External rotation: normal   Internal rotation 0 degrees: normal     Muscle Strength   External rotation: 5/5   Supraspinatus: 5/5     Tests   Cross arm: positive  Drop arm: negative    Other   Erythema: absent  Sensation: normal    Comments:  + AC test      Left Shoulder Exam     Range of Motion   Active abduction: normal   External rotation: normal   Internal rotation 0 degrees: normal     Muscle Strength   External rotation: 5/5   Supraspinatus: 5/5             Physical Exam   Constitutional: He is oriented to person, place, and time  He appears well-developed and well-nourished  HENT:   Head: Normocephalic and atraumatic  Eyes: Conjunctivae are normal    Neck: Neck supple  Pulmonary/Chest: Effort normal    Neurological: He is alert and oriented to person, place, and time  Skin: Skin is warm and dry  Psychiatric: He has a normal mood and affect  His behavior is normal    Vitals reviewed  Neurologic Exam     Mental Status   Oriented to person, place, and time         Procedures    I have personally reviewed the written report of the pertinent studies       RIGHT SHOULDER     INDICATION:   M25 511: Pain in right shoulder      COMPARISON:  None     VIEWS:  XR SHOULDER 2+ VW RIGHT        FINDINGS:     There is no acute fracture or dislocation      No significant degenerative changes      No lytic or blastic lesions are seen      Soft tissues are unremarkable      IMPRESSION:     No acute osseous abnormality

## 2020-01-17 NOTE — LETTER
January 17, 2020     Patient: Nano Delgado III   YOB: 1995   Date of Visit: 1/17/2020       To Whom it May Concern:    Nano Delgado III is under my professional care  He was seen in my office on 1/17/2020  Due to right shoulder injury and pain, restrictions include no push-ups  Limit pushing/pulling/lifting  Follow up in 4 weeks  If you have any questions or concerns, please don't hesitate to call           Sincerely,          You Bates MD        CC: No Recipients

## 2020-01-17 NOTE — PATIENT INSTRUCTIONS
X-ray right shoulder reveals no fracture subluxation  I suspect tenderness problem muscular problem related to the right trapezius muscle  However patient was advised to continue with ibuprofen as needed, apply heating pad to affected area as needed  Patient was given sports medicine referral     Rotator Cuff Injury   WHAT YOU NEED TO KNOW:   A rotator cuff injury is damage to the muscles or tendons of your rotator cuff  The rotator cuff is made up of a group of muscles and tendons that hold the shoulder joint in place  The damage may include stretching of your muscle or tears in the tendons  It may also include inflammation of the bursa (small sack of fluid around the joint)  DISCHARGE INSTRUCTIONS:   · Acetaminophen: This medicine decreases pain  Acetaminophen is available without a doctor's order  Ask how much to take and how often to take it  Follow directions  Acetaminophen can cause liver damage if not taken correctly  · NSAIDs:  These medicines decrease swelling, pain, and fever  NSAIDs are available without a doctor's order  Ask your healthcare provider which medicine is right for you  Ask how much to take and when to take it  Take as directed  NSAIDs can cause stomach bleeding or kidney problems if not taken correctly  · Pain medicine: You may be given a prescription medicine to decrease pain  Do not wait until the pain is severe before you take this medicine  · Steroids: This medicine may be injected into the rotator cuff area to decrease inflammation and pain  · Take your medicine as directed  Contact your healthcare provider if you think your medicine is not helping or if you have side effects  Tell him of her if you are allergic to any medicine  Keep a list of the medicines, vitamins, and herbs you take  Include the amounts, and when and why you take them  Bring the list or the pill bottles to follow-up visits  Carry your medicine list with you in case of an emergency    Follow up with your healthcare provider or orthopedist as directed:  Write down your questions so you remember to ask them during your visits  Physical therapy:  A physical therapist can teach you exercises to help improve movement and strength, and to decrease pain  These exercises may help you go back to your usual activities or return to playing sports  Self-care:   · Rest:  Rest may help your shoulder heal  Overuse of your shoulder can make your injury worse  Avoid heavy lifting, using your arms over your head, or any other activity that makes the pain worse  · Put ice or heat on your shoulder:  Use ice on your shoulder every few hours for the first several days  This may help decrease pain and swelling  After the first several days, a heating pad may help relax the muscles in your shoulder  Contact your healthcare provider or orthopedist if:   · The pain in your shoulder or arm is not improving, or is worse than before you started treatment  · You have new pain in your neck  · You have a fever  · You have questions or concerns about your condition or care  Return to the emergency department if:  · You suddenly cannot move your arm  · You have severe stomach or back pain, are vomiting blood, or have black bowel movements  © 2017 [facility] Information is for End User's use only and may not be sold, redistributed or otherwise used for commercial purposes  All illustrations and images included in CareNotes® are the copyrighted property of [employer]  or [employer]  The above information is an  only  It is not intended as medical advice for individual conditions or treatments  Talk to your doctor, nurse or pharmacist before following any medical regimen to see if it is safe and effective for you

## 2020-01-17 NOTE — PROGRESS NOTES
[facility]        NAME: Noemi Restrepo III is a 27 y o  male  : 1995    MRN: [id number]  DATE: 2020  TIME: 7:44 PM    Assessment and Plan   Right shoulder pain, unspecified chronicity [M25 511]  1  Right shoulder pain, unspecified chronicity  XR shoulder 2+ vw right    Ambulatory referral to Sports Medicine         Patient Instructions       Follow up with PCP in 3-5 days  Proceed to  ER if symptoms worsen  Chief Complaint     Chief Complaint   Patient presents with    Shoulder Pain     Patient states pain first started 2 months ago when he fell while running catching himself with his arms  Pain has been worsening since incident  Pain described as sharp/shooting  Taking ibuprofen 400mg PRN for pain         History of Present Illness       60-year-old male here today with right shoulder pain for the last 2 months  Patient recalls 2 months ago, while running, patient fell with outstretched right left arm developed mild pain  However after running, patient went to lift weights and noticed pain in the right upper shoulder  He has had this occur several times but it seems to resolve when he rests the right shoulder  Pain seems to be localized  Denies any previous injury  He has been taking over-the-counter ibuprofen with mild improvement  Pain and restless minimal however increases to 10/10 with range of motion  He is right handed  Review of Systems   Review of Systems   Musculoskeletal: Positive for arthralgias  Neurological: Negative            Current Medications       Current Outpatient Medications:     ibuprofen (MOTRIN) 600 mg tablet, Take 1 tablet by mouth every 6 (six) hours as needed for mild pain, Disp: 20 tablet, Rfl: 0    Multiple Vitamin (MULTIVITAMIN) tablet, Take 1 tablet by mouth daily, Disp: , Rfl:     Current Allergies     Allergies as of 2020    (No Known Allergies)            The following portions of the patient's history were reviewed and updated as appropriate: allergies, current medications, past family history, past medical history, past social history, past surgical history and problem list      Past Medical History:   Diagnosis Date    Medical history non-contributory        Past Surgical History:   Procedure Laterality Date    HAND SURGERY      NO PAST SURGERIES         Family History   Problem Relation Age of Onset    No Known Problems Father     Heart disease Family     Thyroid cancer Sister     Heart disease Maternal Grandmother     Heart disease Maternal Grandfather          Medications have been verified  Objective   /78   Pulse 70   Temp 98 5 °F (36 9 °C) (Tympanic)   Resp 18   Ht 6' 1" (1 854 m)   Wt 86 3 kg (190 lb 4 1 oz)   SpO2 96%   BMI 25 10 kg/m²        Physical Exam     Physical Exam   Constitutional: He appears well-developed  No distress  Musculoskeletal: He exhibits tenderness  C-spine:  Full range of motion  There is some point tenderness upon palpation of the right trapezius muscle  Right shoulder:  Full range on flexion and extension  Abduction to approximately 160 degrees  Empty can test-negative  No pain elicited on internal external rotation  Apprehension test-positive  Good strength and tone bilaterally, 5/5

## 2020-07-31 ENCOUNTER — TELEPHONE (OUTPATIENT)
Dept: FAMILY MEDICINE CLINIC | Facility: CLINIC | Age: 25
End: 2020-07-31

## 2020-07-31 DIAGNOSIS — Z20.828 EXPOSURE TO SARS-ASSOCIATED CORONAVIRUS: Primary | ICD-10-CM

## 2020-07-31 NOTE — TELEPHONE ENCOUNTER
Patient's mother calling for COVID screen test order       Dr Gavin Morales told her to call and say if she wanted it or not and he would order for her and family  (SEE OFFICE NOTE FROM 7/24/2020 where documented)     She is asking that this be ordered urgently because they just received word that her mother in law is unresponsive in a hospital in [place] and the only way they can have access into the country is having this test done within 72 hours    They are leaving on an early flight tomorrow morning (Saturday 8/1)     PLEASE call Marysol Lee when these are all ready to come  they are 45 minutes away from office and need to go before all sites close     Deena [phone]

## 2020-07-31 NOTE — TELEPHONE ENCOUNTER
7/31/2020 10:52 AM returned call to Elsy Kumar and let her know that there could be a $150 cost associated with the test     Tests ordered    Christopher Bhatt, DO

## 2020-08-03 ENCOUNTER — OFFICE VISIT (OUTPATIENT)
Dept: FAMILY MEDICINE CLINIC | Facility: CLINIC | Age: 25
End: 2020-08-03
Payer: COMMERCIAL

## 2020-08-03 VITALS
WEIGHT: 178 LBS | HEIGHT: 73 IN | SYSTOLIC BLOOD PRESSURE: 120 MMHG | BODY MASS INDEX: 23.59 KG/M2 | RESPIRATION RATE: 16 BRPM | HEART RATE: 60 BPM | TEMPERATURE: 97.8 F | DIASTOLIC BLOOD PRESSURE: 78 MMHG

## 2020-08-03 DIAGNOSIS — Z83.49 FAMILY HISTORY OF THYROID DISEASE: ICD-10-CM

## 2020-08-03 DIAGNOSIS — Z20.828 EXPOSURE TO SARS-ASSOCIATED CORONAVIRUS: ICD-10-CM

## 2020-08-03 DIAGNOSIS — Z23 NEED FOR VACCINATION: ICD-10-CM

## 2020-08-03 DIAGNOSIS — Z00.00 ROUTINE ADULT HEALTH MAINTENANCE: Primary | ICD-10-CM

## 2020-08-03 DIAGNOSIS — Z13.29 SCREENING FOR THYROID DISORDER: ICD-10-CM

## 2020-08-03 DIAGNOSIS — Z13.1 SCREENING FOR DIABETES MELLITUS: ICD-10-CM

## 2020-08-03 DIAGNOSIS — Z11.4 SCREENING FOR HIV (HUMAN IMMUNODEFICIENCY VIRUS): ICD-10-CM

## 2020-08-03 DIAGNOSIS — Z13.0 SCREENING FOR DEFICIENCY ANEMIA: ICD-10-CM

## 2020-08-03 DIAGNOSIS — Z13.6 SCREENING FOR CARDIOVASCULAR CONDITION: ICD-10-CM

## 2020-08-03 PROCEDURE — U0003 INFECTIOUS AGENT DETECTION BY NUCLEIC ACID (DNA OR RNA); SEVERE ACUTE RESPIRATORY SYNDROME CORONAVIRUS 2 (SARS-COV-2) (CORONAVIRUS DISEASE [COVID-19]), AMPLIFIED PROBE TECHNIQUE, MAKING USE OF HIGH THROUGHPUT TECHNOLOGIES AS DESCRIBED BY CMS-2020-01-R: HCPCS | Performed by: FAMILY MEDICINE

## 2020-08-03 PROCEDURE — 3008F BODY MASS INDEX DOCD: CPT | Performed by: FAMILY MEDICINE

## 2020-08-03 PROCEDURE — 90651 9VHPV VACCINE 2/3 DOSE IM: CPT

## 2020-08-03 PROCEDURE — 90471 IMMUNIZATION ADMIN: CPT

## 2020-08-03 PROCEDURE — 3725F SCREEN DEPRESSION PERFORMED: CPT | Performed by: NURSE PRACTITIONER

## 2020-08-03 PROCEDURE — 99395 PREV VISIT EST AGE 18-39: CPT | Performed by: NURSE PRACTITIONER

## 2020-08-03 NOTE — PROGRESS NOTES
FAMILY PRACTICE HEALTH MAINTENANCE OFFICE VISIT  [facility]    NAME: Janice Joseph III  AGE: 23 y o  SEX: male  : 1995     DATE: 8/3/2020    Assessment and Plan   Will do blood work and will call with results  1  Routine adult health maintenance  -     CBC; Future  -     Comprehensive metabolic panel; Future  -     Lipid Panel with Direct LDL reflex; Future    2  Need for vaccination  -     HPV VACCINE 9 VALENT IM    3  Screening for HIV (human immunodeficiency virus)  -     [facility]- Human Immunodeficiency Virus 1/2 Antigen / Antibody ( Fourth Generation) with Reflex Testing; Future    4  Screening for deficiency anemia  -     CBC; Future  -     Comprehensive metabolic panel; Future    5  Screening for cardiovascular condition  -     Comprehensive metabolic panel; Future  -     Lipid Panel with Direct LDL reflex; Future    6  Screening for diabetes mellitus    7  Screening for thyroid disorder  -     Comprehensive metabolic panel; Future  -     TSH, 3rd generation with Free T4 reflex; Future    8  Family history of thyroid disease  -     TSH, 3rd generation with Free T4 reflex; Future        · Patient Counseling:   · Nutrition: Stressed importance of a well balanced diet, moderation of sodium/saturated fat, caloric balance and sufficient intake of fiber  · Exercise: Stressed the importance of regular exercise with a goal of 150 minutes per week  · Dental Health: Discussed daily flossing and brushing and regular dental visits   · Sexuality: Discussed sexually transmitted infections, use of condoms and prevention of unintended pregnancy  · Alcohol Use:  Recommended moderation of alcohol intake  · Injury Prevention: Discussed Safety Belts, Safety Helmets, and Smoke Detectors    · Immunizations reviewed: See Orders will do 3rd HPV vaccine in 12 weeks as started series at age of 18  · Discussed benefits of:  Screening labs  BMI Counseling:  Body mass index is 23 48 kg/m²  Discussed with patient's BMI with him  Return in about 1 year (around 8/3/2021) for Annual physical         Chief Complaint     Chief Complaint   Patient presents with    Physical Exam     ac/cma        History of Present Illness     HPI    Well Adult Physical   Patient here for a comprehensive physical exam   Denies any concerns and complains  Sister was diagnosed with thyroid cancer last year       Diet and Physical Activity  Diet: well balanced diet  Exercise: daily      Depression Screen  PHQ-9 Depression Screening    PHQ-9:    Frequency of the following problems over the past two weeks:       Little interest or pleasure in doing things:  0 - not at all  Feeling down, depressed, or hopeless:  0 - not at all  PHQ-2 Score:  0          General Health  Hearing: Normal:  bilateral  Vision: most recent eye exam <1 year and wears contacts  Dental: regular dental visits    Reproductive Health  Denies nocturia and Monthly self testicular exams recommended      The following portions of the patient's history were reviewed and updated as appropriate: allergies, current medications, past family history, past medical history, past social history, past surgical history and problem list     Review of Systems     Review of Systems   Constitutional: Negative  HENT: Negative  Eyes: Negative  Respiratory: Negative  Cardiovascular: Negative  Gastrointestinal: Negative  Endocrine: Negative  Genitourinary: Negative  Musculoskeletal: Negative  Skin: Negative  Allergic/Immunologic: Negative  Neurological: Negative  Hematological: Negative  Psychiatric/Behavioral: Negative          Past Medical History     Past Medical History:   Diagnosis Date    Medical history non-contributory        Past Surgical History     Past Surgical History:   Procedure Laterality Date    HAND SURGERY      NO PAST SURGERIES         Social History     Social History     Socioeconomic History    Marital status: Single     Spouse name: None    Number of children: None    Years of education: None    Highest education level: None   Occupational History    None   Social Needs    Financial resource strain: None    Food insecurity     Worry: None     Inability: None    Transportation needs     Medical: None     Non-medical: None   Tobacco Use    Smoking status: Never Smoker    Smokeless tobacco: Never Used   Substance and Sexual Activity    Alcohol use: Not Currently     Frequency: 2-3 times a week     Drinks per session: 1 or 2     Binge frequency: Never    Drug use: No    Sexual activity: None   Lifestyle    Physical activity     Days per week: None     Minutes per session: None    Stress: None   Relationships    Social connections     Talks on phone: None     Gets together: None     Attends Mormonism service: None     Active member of club or organization: None     Attends meetings of clubs or organizations: None     Relationship status: None    Intimate partner violence     Fear of current or ex partner: None     Emotionally abused: None     Physically abused: None     Forced sexual activity: None   Other Topics Concern    None   Social History Narrative    None       Family History     Family History   Problem Relation Age of Onset    No Known Problems Father     Heart disease Family     Thyroid cancer Sister     Heart disease Maternal Grandmother     Heart disease Maternal Grandfather        Current Medications       Current Outpatient Medications:     ibuprofen (MOTRIN) 600 mg tablet, Take 1 tablet by mouth every 6 (six) hours as needed for mild pain, Disp: 20 tablet, Rfl: 0    Multiple Vitamin (MULTIVITAMIN) tablet, Take 1 tablet by mouth daily, Disp: , Rfl:      Allergies     No Known Allergies    Objective     /78   Pulse 60   Temp 97 8 °F (36 6 °C)   Resp 16   Ht 6' 1"   Wt 80 7 kg (178 lb)   BMI 23 48 kg/m²      Physical Exam   Constitutional: He is oriented to person, place, and time  He appears well-developed  HENT:   Head: Normocephalic  Right Ear: Tympanic membrane, external ear and ear canal normal    Left Ear: Tympanic membrane, external ear and ear canal normal    Nose: Nose normal  Right sinus exhibits no maxillary sinus tenderness and no frontal sinus tenderness  Left sinus exhibits no maxillary sinus tenderness and no frontal sinus tenderness  Eyes: Pupils are equal, round, and reactive to light  Conjunctivae and lids are normal    Neck: Normal range of motion and full passive range of motion without pain  Neck supple  Carotid bruit is not present  No thyromegaly present  Cardiovascular: Normal rate, regular rhythm and normal heart sounds  No murmur heard  Pulses:       Radial pulses are 2+ on the right side and 2+ on the left side  Femoral pulses are 2+ on the right side and 2+ on the left side  Dorsalis pedis pulses are 2+ on the right side and 2+ on the left side  Posterior tibial pulses are 2+ on the right side and 2+ on the left side  Pulmonary/Chest: Effort normal and breath sounds normal    Abdominal: Soft  Normal appearance and bowel sounds are normal  There is no hepatomegaly  There is no abdominal tenderness  There is no rebound  No hernia  Hernia confirmed negative in the ventral area and confirmed negative in the left inguinal area  Musculoskeletal: Normal range of motion  General: No tenderness or deformity  Lymphadenopathy:        Right cervical: No superficial cervical and no posterior cervical adenopathy present  Left cervical: No superficial cervical and no posterior cervical adenopathy present  Right: No supraclavicular adenopathy present  Left: No supraclavicular adenopathy present  Neurological: He is alert and oriented to person, place, and time  He has normal reflexes  No sensory deficit  Coordination and gait normal  GCS eye subscore is 4   GCS verbal subscore is 5  GCS motor subscore is 6  Skin: Skin is warm and dry  Psychiatric: His speech is normal and behavior is normal  Judgment and thought content normal    Vitals reviewed           Visual Acuity Screening    Right eye Left eye Both eyes   Without correction:      With correction: 20/20 20/20 20/20           Shaun Spear[address]

## 2020-08-04 ENCOUNTER — TELEPHONE (OUTPATIENT)
Dept: FAMILY MEDICINE CLINIC | Facility: CLINIC | Age: 25
End: 2020-08-04

## 2020-08-04 LAB — SARS-COV-2 RNA SPEC QL NAA+PROBE: NOT DETECTED

## 2020-08-04 NOTE — TELEPHONE ENCOUNTER
I tried calling pt again, no answer and it went right to voicemail  However, on the voicemail it was a womens voice and I could not leave a message as the mailbox was full   Shimon So, [place]

## 2020-08-04 NOTE — TELEPHONE ENCOUNTER
Tried calling pt x2, first time it sounded like someone picked up and hung up   I tried calling the second time and I got a busy signal  Imani Matias MA

## 2020-08-04 NOTE — TELEPHONE ENCOUNTER
----- Message from Robert Pollock DO sent at 8/4/2020  9:11 AM EDT -----  Please call patient to let him know his COVID test is negative    Robert Pollock DO

## 2020-08-06 LAB
ALBUMIN SERPL-MCNC: 5.1 G/DL (ref 4.1–5.2)
ALBUMIN/GLOB SERPL: 1.7 {RATIO} (ref 1.2–2.2)
ALP SERPL-CCNC: 88 IU/L (ref 39–117)
ALT SERPL-CCNC: 29 IU/L (ref 0–44)
AST SERPL-CCNC: 26 IU/L (ref 0–40)
BASOPHILS # BLD AUTO: 0.1 X10E3/UL (ref 0–0.2)
BASOPHILS NFR BLD AUTO: 1 %
BILIRUB SERPL-MCNC: 0.8 MG/DL (ref 0–1.2)
BUN SERPL-MCNC: 12 MG/DL (ref 6–20)
BUN/CREAT SERPL: 13 (ref 9–20)
CALCIUM SERPL-MCNC: 9.9 MG/DL (ref 8.7–10.2)
CHLORIDE SERPL-SCNC: 99 MMOL/L (ref 96–106)
CHOLEST SERPL-MCNC: 148 MG/DL (ref 100–199)
CO2 SERPL-SCNC: 25 MMOL/L (ref 20–29)
CREAT SERPL-MCNC: 0.96 MG/DL (ref 0.76–1.27)
EOSINOPHIL # BLD AUTO: 0.1 X10E3/UL (ref 0–0.4)
EOSINOPHIL NFR BLD AUTO: 2 %
ERYTHROCYTE [DISTWIDTH] IN BLOOD BY AUTOMATED COUNT: 12.4 % (ref 11.6–15.4)
GLOBULIN SER-MCNC: 3 G/DL (ref 1.5–4.5)
GLUCOSE SERPL-MCNC: 90 MG/DL (ref 65–99)
HCT VFR BLD AUTO: 44.2 % (ref 37.5–51)
HDLC SERPL-MCNC: 53 MG/DL
HGB BLD-MCNC: 14.8 G/DL (ref 13–17.7)
HIV 1+2 AB+HIV1 P24 AG SERPL QL IA: NON REACTIVE
IMM GRANULOCYTES # BLD: 0 X10E3/UL (ref 0–0.1)
IMM GRANULOCYTES NFR BLD: 0 %
LDLC SERPL CALC-MCNC: 86 MG/DL (ref 0–99)
LYMPHOCYTES # BLD AUTO: 1.7 X10E3/UL (ref 0.7–3.1)
LYMPHOCYTES NFR BLD AUTO: 34 %
MCH RBC QN AUTO: 27.5 PG (ref 26.6–33)
MCHC RBC AUTO-ENTMCNC: 33.5 G/DL (ref 31.5–35.7)
MCV RBC AUTO: 82 FL (ref 79–97)
MICRODELETION SYND BLD/T FISH: NORMAL
MONOCYTES # BLD AUTO: 0.5 X10E3/UL (ref 0.1–0.9)
MONOCYTES NFR BLD AUTO: 11 %
NEUTROPHILS # BLD AUTO: 2.7 X10E3/UL (ref 1.4–7)
NEUTROPHILS NFR BLD AUTO: 52 %
PLATELET # BLD AUTO: 248 X10E3/UL (ref 150–450)
POTASSIUM SERPL-SCNC: 4.4 MMOL/L (ref 3.5–5.2)
PROT SERPL-MCNC: 8.1 G/DL (ref 6–8.5)
RBC # BLD AUTO: 5.39 X10E6/UL (ref 4.14–5.8)
SL AMB EGFR AFRICAN AMERICAN: 128 ML/MIN/1.73
SL AMB EGFR NON AFRICAN AMERICAN: 111 ML/MIN/1.73
SODIUM SERPL-SCNC: 139 MMOL/L (ref 134–144)
TRIGL SERPL-MCNC: 45 MG/DL (ref 0–149)
TSH SERPL DL<=0.005 MIU/L-ACNC: 0.99 UIU/ML (ref 0.45–4.5)
WBC # BLD AUTO: 5.2 X10E3/UL (ref 3.4–10.8)

## 2020-08-28 ENCOUNTER — TELEMEDICINE (OUTPATIENT)
Dept: FAMILY MEDICINE CLINIC | Facility: CLINIC | Age: 25
End: 2020-08-28
Payer: COMMERCIAL

## 2020-08-28 DIAGNOSIS — Z20.828 EXPOSURE TO SARS-ASSOCIATED CORONAVIRUS: Primary | ICD-10-CM

## 2020-08-28 PROCEDURE — 99213 OFFICE O/P EST LOW 20 MIN: CPT | Performed by: FAMILY MEDICINE

## 2020-08-28 PROCEDURE — 1036F TOBACCO NON-USER: CPT | Performed by: FAMILY MEDICINE

## 2020-08-28 NOTE — PROGRESS NOTES
COVID-19 Virtual Visit     Assessment/Plan:    Problem List Items Addressed This Visit     None      Visit Diagnoses     Exposure to SARS-associated coronavirus    -  Primary    Relevant Orders    Novel Coronavirus (COVID-19), PCR [facility] - Collected at [facility] or [facility]        This virtual check-in was done via [employer] and patient was informed that this is not a secure, HIPAA-complaint platform  He agrees to proceed     Disposition:      I referred Chelle Sales to one of our centralized sites for a COVID-19 swab    I spent 15 minutes directly with the patient during this visit    Encounter provider Gasper Bernheim, MD    Provider located at [facility] [address] [place] [id number]    Recent Visits  No visits were found meeting these conditions  Showing recent visits within past 7 days and meeting all other requirements     Today's Visits  Date Type Provider Dept   08/28/20 Telemedicine Gasper Bernheim, [address] [place] [address] today's visits and meeting all other requirements     Future Appointments  No visits were found meeting these conditions  Showing future appointments within next 150 days and meeting all other requirements        Patient agrees to participate in a virtual check in via telephone or video visit instead of presenting to the office to address urgent/immediate medical needs  Patient is aware this is a billable service  After connecting through [employer], the patient was identified by name and date of birth  Karen Kasper III was informed that this was a telemedicine visit and that the exam was being conducted confidentially over secure lines  My office door was closed  No one else was in the room  Karen Kasper III acknowledged consent and understanding of privacy and security of the telemedicine visit    I informed the patient that I have reviewed his record in Epic and presented the opportunity for him to ask any questions regarding the visit today  The patient agreed to participate  Subjective  Daniel Paniagua III is a 23 y o  male who is concerned about COVID-19  Pt's father was just diagnosed with COVID-19 today after returning from a trip to [facility]      He reports no symptoms, requires screening  He has not experienced no symptoms, requires screening He has had contact with a person who is under investigation for or who is positive for COVID-19 within the last 14 days  He has not been hospitalized recently for fever and/or lower respiratory symptoms  Past Medical History:   Diagnosis Date    Medical history non-contributory        Past Surgical History:   Procedure Laterality Date    HAND SURGERY      NO PAST SURGERIES         Current Outpatient Medications   Medication Sig Dispense Refill    ibuprofen (MOTRIN) 600 mg tablet Take 1 tablet by mouth every 6 (six) hours as needed for mild pain 20 tablet 0    Multiple Vitamin (MULTIVITAMIN) tablet Take 1 tablet by mouth daily       No current facility-administered medications for this visit  No Known Allergies    Review of Systems   Constitutional: Negative for activity change, appetite change, chills, diaphoresis, fatigue and fever  HENT: Negative for congestion, postnasal drip, rhinorrhea, sinus pressure, sneezing and sore throat  Eyes: Negative  Respiratory: Negative for cough, choking, chest tightness, shortness of breath and wheezing  Cardiovascular: Negative for chest pain and leg swelling  Gastrointestinal: Negative for abdominal distention, anal bleeding, blood in stool, constipation, diarrhea, nausea and vomiting  Genitourinary: Negative  Musculoskeletal: Negative for arthralgias, gait problem and myalgias  Skin: Negative for color change, pallor, rash and wound  Neurological: Negative for dizziness, tremors, syncope, weakness, light-headedness, numbness and headaches  Psychiatric/Behavioral: Negative          Video Exam    There were no vitals filed for this visit  Real Parker appears healthy  Physical Exam  Constitutional:       General: He is not in acute distress  Appearance: He is well-developed  He is not diaphoretic  HENT:      Head: Normocephalic and atraumatic  Eyes:      General: No scleral icterus  Right eye: No discharge  Left eye: No discharge  Conjunctiva/sclera: Conjunctivae normal    Neck:      Musculoskeletal: Normal range of motion  Pulmonary:      Effort: Pulmonary effort is normal    Skin:     General: Skin is warm  Neurological:      Mental Status: He is alert and oriented to person, place, and time  Psychiatric:         Behavior: Behavior normal          Thought Content: Thought content normal          Judgment: Judgment normal           VIRTUAL VISIT DISCLAIMER    Fahad Valverde III acknowledges that he has consented to an online visit or consultation  He understands that the online visit is based solely on information provided by him, and that, in the absence of a face-to-face physical evaluation by the physician, the diagnosis he receives is both limited and provisional in terms of accuracy and completeness  This is not intended to replace a full medical face-to-face evaluation by the physician  Fahad Valverde III understands and accepts these terms

## 2020-08-29 DIAGNOSIS — Z20.828 EXPOSURE TO SARS-ASSOCIATED CORONAVIRUS: ICD-10-CM

## 2020-08-29 PROCEDURE — U0003 INFECTIOUS AGENT DETECTION BY NUCLEIC ACID (DNA OR RNA); SEVERE ACUTE RESPIRATORY SYNDROME CORONAVIRUS 2 (SARS-COV-2) (CORONAVIRUS DISEASE [COVID-19]), AMPLIFIED PROBE TECHNIQUE, MAKING USE OF HIGH THROUGHPUT TECHNOLOGIES AS DESCRIBED BY CMS-2020-01-R: HCPCS | Performed by: FAMILY MEDICINE

## 2020-08-30 LAB — SARS-COV-2 RNA SPEC QL NAA+PROBE: NOT DETECTED

## 2021-01-11 ENCOUNTER — OFFICE VISIT (OUTPATIENT)
Dept: URGENT CARE | Facility: MEDICAL CENTER | Age: 26
End: 2021-01-11
Payer: COMMERCIAL

## 2021-01-11 VITALS
SYSTOLIC BLOOD PRESSURE: 132 MMHG | BODY MASS INDEX: 23.59 KG/M2 | DIASTOLIC BLOOD PRESSURE: 78 MMHG | HEART RATE: 72 BPM | HEIGHT: 73 IN | OXYGEN SATURATION: 98 % | RESPIRATION RATE: 15 BRPM | TEMPERATURE: 99 F | WEIGHT: 178 LBS
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
## 2021-01-11 DIAGNOSIS — S29.012A REPETITIVE STRAIN INJURY OF THORACIC SPINE, INITIAL ENCOUNTER: Primary | ICD-10-CM

## 2021-01-11 PROCEDURE — 99213 OFFICE O/P EST LOW 20 MIN: CPT | Performed by: FAMILY MEDICINE

## 2021-01-11 RX ORDER — METHOCARBAMOL 500 MG/1
500 TABLET, FILM COATED ORAL 2 TIMES DAILY PRN
Qty: 20 TABLET | Refills: 0 | Status: SHIPPED | OUTPATIENT
Start: 2021-01-11

## 2021-01-11 NOTE — PATIENT INSTRUCTIONS
No evidence of radiculopathy  I prescribed Robaxin 500 mg b i d  He was warned about somnolence  Advised to apply heating pad 15-20 minutes 2 to 3 times a day or more often  Perform upper back stretching activities  If symptoms persist beyond a week, he is to consider physical therapy  He was given outpatient physical therapy referral     Acute Low Back Pain   WHAT YOU NEED TO KNOW:   Acute low back pain is sudden discomfort in your lower back area that lasts for up to 6 weeks  The discomfort makes it difficult to tolerate activity  DISCHARGE INSTRUCTIONS:   Return to the emergency department if:   · You have severe pain  · You have sudden stiffness and heaviness on both buttocks down to both legs  · You have numbness or weakness in one leg, or pain in both legs  · You have numbness in your genital area or across your lower back  · You cannot control your urine or bowel movements  Contact your healthcare provider if:   · You have a fever  · You have pain at night or when you rest     · Your pain does not get better with treatment  · You have pain that worsens when you cough or sneeze  · You suddenly feel something pop or snap in your back  · You have questions or concerns about your condition or care  Medicines: You may need any of the following:  · NSAIDs  help decrease swelling and pain  This medicine is available with or without a doctor's order  NSAIDs can cause stomach bleeding or kidney problems in certain people  If you take blood thinner medicine, always ask your healthcare provider if NSAIDs are safe for you  Always read the medicine label and follow directions  · Acetaminophen  decreases pain and fever  It is available without a doctor's order  Ask how much to take and how often to take it  Follow directions   Read the labels of all other medicines you are using to see if they also contain acetaminophen, or ask your doctor or pharmacist  Acetaminophen can cause liver damage if not taken correctly  Do not use more than 4 grams (4,000 milligrams) total of acetaminophen in one day  · Muscle relaxers  decrease pain by relaxing the muscles in your lower spine  · Prescription pain medicine  may be given  Ask your healthcare provider how to take this medicine safely  Some prescription pain medicines contain acetaminophen  Do not take other medicines that contain acetaminophen without talking to your healthcare provider  Too much acetaminophen may cause liver damage  Prescription pain medicine may cause constipation  Ask your healthcare provider how to prevent or treat constipation  Self-care:   · Stay active  as much as you can without causing more pain  Bed rest could make your back pain worse  Start with some light exercises, such as walking  Avoid heavy lifting until your pain is gone  Ask for more information about the activities or exercises that are right for you  · Apply ice  on your back for 15 to 20 minutes every hour or as directed  Use an ice pack, or put crushed ice in a plastic bag  Cover it with a towel before you apply it to your skin  Ice helps prevent tissue damage and decreases swelling and pain  · Apply heat  on your back for 20 to 30 minutes every 2 hours for as many days as directed  Heat helps decrease pain and muscle spasms  Alternate heat and ice  Prevent acute low back pain:   · Use proper body mechanics  ? Bend at the hips and knees when you  objects  Do not bend from the waist  Use your leg muscles as you lift the load  Do not use your back  Keep the object close to your chest as you lift it  Try not to twist or lift anything above your waist     ? Change your position often when you stand for long periods of time  Rest one foot on a small box or footrest, and then switch to the other foot often  ? Try not to sit for long periods of time  When you do, sit in a straight-backed chair with your feet flat on the floor  Never reach, pull, or push while you are sitting  · Do exercises that strengthen your back muscles  Warm up before you exercise  Ask your healthcare provider the best exercises for you  · Maintain a healthy weight  Ask your healthcare provider how much you should weigh  Ask him or her to help you create a weight loss plan if you are overweight  Follow up with your healthcare provider as directed:  Return for a follow-up visit if you still have pain after 1 to 3 weeks of treatment  You may need to visit an orthopedist if your back pain lasts longer than 12 weeks  Write down your questions so you remember to ask them during your visits  © Copyright [address] Information is for End User's use only and may not be sold, redistributed or otherwise used for commercial purposes  All illustrations and images included in CareNotes® are the copyrighted property of A D A M , Inc  or [facility] [employer]   The above information is an  only  It is not intended as medical advice for individual conditions or treatments  Talk to your doctor, nurse or pharmacist before following any medical regimen to see if it is safe and effective for you

## 2021-01-11 NOTE — PROGRESS NOTES
[facility]        NAME: Aly Gerber III is a 23 y o  male  : 1995    MRN: [id number]  DATE: 2021  TIME: 1:14 PM    Assessment and Plan   Repetitive strain injury of thoracic spine, initial encounter [H91 820Y, X50  3XXA]  1  Repetitive strain injury of thoracic spine, initial encounter  methocarbamol (ROBAXIN) 500 mg tablet    Ambulatory referral to Physical Therapy         Patient Instructions       Follow up with PCP in 3-5 days  Proceed to  ER if symptoms worsen  Chief Complaint     Chief Complaint   Patient presents with    Back Pain     Pt c/o left upper back pain for approx 4-5 days  States that he woke up one day and it hurt  The pain is worse when he lifts and extends left arm or when he twists his body  Pt has taken ibuprofen and tried stretching with minor relief  History of Present Illness       61-year-old male here today with left upper back pain for the last for 5 days  Denies any recent fall or trauma  Pain seems to be medial to the shoulder blade  Seems to be localized  Denies numbness or weakness of the left upper extremity  He has been taking ibuprofen with minimal improvement  Also applying heating pad also with minimal improvement  He has noticed and when he bends or twists to the left is seems to aggravate symptoms  This is the 1st time this has happened to him  Review of Systems   Review of Systems   Constitutional: Negative  Musculoskeletal: Positive for back pain  Neurological: Negative            Current Medications       Current Outpatient Medications:     ibuprofen (MOTRIN) 600 mg tablet, Take 1 tablet by mouth every 6 (six) hours as needed for mild pain, Disp: 20 tablet, Rfl: 0    methocarbamol (ROBAXIN) 500 mg tablet, Take 1 tablet (500 mg total) by mouth 2 (two) times a day as needed for muscle spasms, Disp: 20 tablet, Rfl: 0    Multiple Vitamin (MULTIVITAMIN) tablet, Take 1 tablet by mouth daily, Disp: , Rfl:     Current Allergies     Allergies as of 01/11/2021    (No Known Allergies)            The following portions of the patient's history were reviewed and updated as appropriate: allergies, current medications, past family history, past medical history, past social history, past surgical history and problem list      Past Medical History:   Diagnosis Date    Medical history non-contributory        Past Surgical History:   Procedure Laterality Date    HAND SURGERY      NO PAST SURGERIES         Family History   Problem Relation Age of Onset    No Known Problems Father     Heart disease Family     Thyroid cancer Sister     Heart disease Maternal Grandmother     Heart disease Maternal Grandfather          Medications have been verified  Objective   /78   Pulse 72   Temp 99 °F (37 2 °C)   Resp 15   Ht 6' 1" (1 854 m)   Wt 80 7 kg (178 lb)   SpO2 98%   BMI 23 48 kg/m²   No LMP for male patient  Physical Exam     Physical Exam  Vitals signs and nursing note reviewed  Constitutional:       Appearance: Normal appearance  Neck:      Musculoskeletal: Normal range of motion  Musculoskeletal:         General: Tenderness present  Comments: C-spine:  Full range of motion  Nontender  Thoracic spine:  Full range of motion  There is some tenderness and fullness appreciated over the left paraspinal muscle as left scapular area tender to touch  Extremities:  Upper-good strength and tone, 5/5  Neurological:      Mental Status: He is alert

## 2024-08-01 ENCOUNTER — OFFICE VISIT (OUTPATIENT)
Dept: FAMILY MEDICINE CLINIC | Facility: CLINIC | Age: 29
End: 2024-08-01
Payer: OTHER GOVERNMENT

## 2024-08-01 VITALS
BODY MASS INDEX: 30.43 KG/M2 | DIASTOLIC BLOOD PRESSURE: 84 MMHG | HEART RATE: 74 BPM | WEIGHT: 229.6 LBS | SYSTOLIC BLOOD PRESSURE: 122 MMHG | HEIGHT: 73 IN | OXYGEN SATURATION: 98 %

## 2024-08-01 DIAGNOSIS — R40.0 DAYTIME SLEEPINESS: ICD-10-CM

## 2024-08-01 DIAGNOSIS — R06.83 SNORING: ICD-10-CM

## 2024-08-01 DIAGNOSIS — Z11.59 NEED FOR HEPATITIS C SCREENING TEST: ICD-10-CM

## 2024-08-01 DIAGNOSIS — Z11.4 SCREENING FOR HIV (HUMAN IMMUNODEFICIENCY VIRUS): ICD-10-CM

## 2024-08-01 DIAGNOSIS — Z13.6 SCREENING FOR CARDIOVASCULAR CONDITION: ICD-10-CM

## 2024-08-01 DIAGNOSIS — Z00.00 ANNUAL PHYSICAL EXAM: Primary | ICD-10-CM

## 2024-08-01 DIAGNOSIS — E55.9 VITAMIN D DEFICIENCY: ICD-10-CM

## 2024-08-01 DIAGNOSIS — R06.81 WITNESSED EPISODE OF APNEA: ICD-10-CM

## 2024-08-01 PROCEDURE — 99204 OFFICE O/P NEW MOD 45 MIN: CPT | Performed by: FAMILY MEDICINE

## 2024-08-01 PROCEDURE — 99385 PREV VISIT NEW AGE 18-39: CPT | Performed by: FAMILY MEDICINE

## 2024-08-01 RX ORDER — ALBUTEROL SULFATE 90 UG/1
2 AEROSOL, METERED RESPIRATORY (INHALATION) EVERY 4 HOURS PRN
COMMUNITY
Start: 2024-03-18

## 2024-08-01 NOTE — PROGRESS NOTES
Subjective:    ADEEL Lofton is a 29 y.o. male here today for:  Chief Complaint   Patient presents with    Establish Care     Pt would like sleep study referral, would like blood work    .      ---Above per clinical staff & reviewed. 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  Getting masters in HR management    Grew up [facility]  11yo- moved to [place]  Graduated from [employer] 2015  Parents retired and moved to [place]  Sister in [facility]- works as juvenile crisis therapist    PHQ-2/9 Depression Screening    Little interest or pleasure in doing things: 0 - not at all  Feeling down, depressed, or hopeless: 0 - not at all  PHQ-2 Score: 0  PHQ-2 Interpretation: Negative depression screen              The following portions of the patient's history were reviewed and updated as appropriate: allergies, current medications, past family history, past medical history, past social history, past surgical history and problem list.    Past Medical History:   Diagnosis Date    Medical history non-contributory        Past Surgical History:   Procedure Laterality Date    HAND SURGERY      NO PAST SURGERIES         Social History     Socioeconomic History    Marital status: Single     Spouse name: None    Number of children: None    Years of  education: None    Highest education level: None   Occupational History    None   Tobacco Use    Smoking status: Never    Smokeless tobacco: Never   Vaping Use    Vaping status: Never Used   Substance and Sexual Activity    Alcohol use: Not Currently    Drug use: No    Sexual activity: Yes     Partners: Female     Birth control/protection: I.U.D.   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    Getting masters in HR management        [place]    9yo- moved to [place]    Graduated from [employer] 2015    Parents retired and moved to [facility] in [facility]- works as juvenile crisis therapist     Social Determinants of Health     Financial Resource Strain: Not on file   Food Insecurity: Not on file   Transportation Needs: Not on file   Physical Activity: Not on file   Stress: Not on file   Social Connections: Unknown (6/18/2024)    Received from [employer]    Social [employer]ie     How often do you feel lonely or isolated from those around you? (Adult - for ages 18 years and over): Not on file   Intimate Partner Violence: Not on file   Housing Stability: Not on file       Current Outpatient Medications   Medication Sig Dispense Refill    albuterol (PROVENTIL HFA,VENTOLIN HFA) 90 mcg/act inhaler Inhale 2 puffs every 4 (four) hours as needed      ibuprofen (MOTRIN) 600 mg tablet Take 1 tablet by mouth every 6 (six) hours as needed for mild pain 20 tablet 0    methocarbamol (ROBAXIN) 500 mg tablet Take 1 tablet (500 mg total) by mouth 2 (two) times a day as needed for muscle spasms 20 tablet 0    Multiple Vitamin (MULTIVITAMIN) tablet Take 1 tablet by mouth daily       No current facility-administered medications  "for this visit.        Review of Systems   Constitutional: Negative.  Negative for chills and fever.        Snoring, daytime sleepiness   HENT: Negative.  Negative for ear pain and sore throat.    Eyes:  Negative for pain and visual disturbance.   Respiratory: Negative.  Negative for cough and shortness of breath.    Cardiovascular: Negative.  Negative for chest pain and palpitations.   Gastrointestinal: Negative.  Negative for abdominal pain and vomiting.   Genitourinary: Negative.  Negative for dysuria and hematuria.   Musculoskeletal: Negative.  Negative for arthralgias and back pain.   Skin: Negative.  Negative for color change and rash.   Neurological: Negative.  Negative for seizures and syncope.   Psychiatric/Behavioral:  Positive for sleep disturbance.    All other systems reviewed and are negative.       Objective:    /84 (BP Location: Left arm, Patient Position: Sitting, Cuff Size: Large)   Pulse 74   Ht 6' 1\" (1.854 m)   Wt 104 kg (229 lb 9.6 oz)   SpO2 98%   BMI 30.29 kg/m²   Wt Readings from Last 3 Encounters:   08/01/24 104 kg (229 lb 9.6 oz)   01/11/21 80.7 kg (178 lb)   08/03/20 80.7 kg (178 lb)     BP Readings from Last 3 Encounters:   08/01/24 122/84   01/11/21 132/78   08/03/20 120/78       Lab Results   Component Value Date    WBC 5.2 08/05/2020    HGB 14.8 08/05/2020    HCT 44.2 08/05/2020     08/05/2020    TRIG 45 08/05/2020    HDL 53 08/05/2020    ALT 29 08/05/2020    AST 26 08/05/2020    K 4.4 08/05/2020    CL 99 08/05/2020    CREATININE 0.96 08/05/2020    BUN 12 08/05/2020    CO2 25 08/05/2020    TSH 0.995 08/05/2020       Physical Exam  Vitals and nursing note reviewed.   Constitutional:       Appearance: Normal appearance. He is well-developed.   HENT:      Head: Normocephalic and atraumatic.      Right Ear: Tympanic membrane and external ear normal.      Left Ear: Tympanic membrane and external ear normal.      Nose: Nose normal.      Mouth/Throat:      Mouth: Mucous " membranes are moist.   Eyes:      Extraocular Movements: Extraocular movements intact.      Conjunctiva/sclera: Conjunctivae normal.      Pupils: Pupils are equal, round, and reactive to light.   Neck:      Thyroid: No thyromegaly.      Comments: No carotid bruits  Cardiovascular:      Rate and Rhythm: Normal rate and regular rhythm.      Pulses: Normal pulses.      Heart sounds: Normal heart sounds. No murmur heard.  Pulmonary:      Effort: Pulmonary effort is normal. No respiratory distress.      Breath sounds: Normal breath sounds.   Abdominal:      General: Bowel sounds are normal. There is no distension.      Palpations: Abdomen is soft.      Tenderness: There is no abdominal tenderness.   Musculoskeletal:         General: Normal range of motion.      Cervical back: Normal range of motion and neck supple.   Skin:     General: Skin is warm.      Capillary Refill: Capillary refill takes less than 2 seconds.   Neurological:      General: No focal deficit present.      Mental Status: He is alert and oriented to person, place, and time.      Cranial Nerves: No cranial nerve deficit.   Psychiatric:         Mood and Affect: Mood normal.         Behavior: Behavior normal.         Thought Content: Thought content normal.                       Assessment/Plan:   Rolly was seen today for establish care.    Diagnoses and all orders for this visit:    Annual physical exam  -     Lipid panel; Future  -     CBC and differential; Future  -     Comprehensive metabolic panel; Future  -     TSH, 3rd generation with Free T4 reflex; Future  -     Vitamin D 25 hydroxy; Future    Witnessed episode of apnea  -     Lipid panel; Future  -     CBC and differential; Future  -     Comprehensive metabolic panel; Future  -     TSH, 3rd generation with Free T4 reflex; Future  -     Vitamin D 25 hydroxy; Future  -     Ambulatory Referral to Sleep Medicine; Future    Daytime sleepiness  -     Lipid panel; Future  -     CBC and differential;  Future  -     Comprehensive metabolic panel; Future  -     TSH, 3rd generation with Free T4 reflex; Future  -     Vitamin D 25 hydroxy; Future  -     Ambulatory Referral to Sleep Medicine; Future    Snoring  -     Lipid panel; Future  -     CBC and differential; Future  -     Comprehensive metabolic panel; Future  -     TSH, 3rd generation with Free T4 reflex; Future  -     Vitamin D 25 hydroxy; Future  -     Ambulatory Referral to Sleep Medicine; Future    Vitamin D deficiency  -     Lipid panel; Future  -     CBC and differential; Future  -     Comprehensive metabolic panel; Future  -     TSH, 3rd generation with Free T4 reflex; Future  -     Vitamin D 25 hydroxy; Future    BMI 30.0-30.9,adult  -     Lipid panel; Future  -     CBC and differential; Future  -     Comprehensive metabolic panel; Future  -     TSH, 3rd generation with Free T4 reflex; Future  -     Vitamin D 25 hydroxy; Future    Screening for HIV (human immunodeficiency virus)  -     Lipid panel; Future  -     CBC and differential; Future  -     Comprehensive metabolic panel; Future  -     TSH, 3rd generation with Free T4 reflex; Future  -     Vitamin D 25 hydroxy; Future  -     HIV 1/2 AB/AG w Reflex [facility] for 2 yr old and above; Future    Screening for cardiovascular condition  -     Lipid panel; Future  -     CBC and differential; Future  -     Comprehensive metabolic panel; Future  -     TSH, 3rd generation with Free T4 reflex; Future  -     Vitamin D 25 hydroxy; Future    Need for hepatitis C screening test  -     Lipid panel; Future  -     CBC and differential; Future  -     Comprehensive metabolic panel; Future  -     TSH, 3rd generation with Free T4 reflex; Future  -     Vitamin D 25 hydroxy; Future  -     Hepatitis C antibody; Future      Return in about 1 year (around 8/1/2025) for Annual physical.  Patient Instructions   Please get your labwork done fasting.  Do not eat/drink for about 8-12 hours prior to getting the labwork done, however  "you may drink water or black coffee while you are fasting.  Please use a [facility] lab if possible, as we receive the lab results more quickly.  We will notify you of your labwork results even if they are normal.  Please contact us if you do not hear about your lab results after one week.  Healthy low carb, high protein diet  Drink at least 100 ounces water daily.  Please call for sleep study.  Check childhood vaccines for HPV    Patient Education     Routine physical for adults   The Basics   Written by the doctors and editors at [facility]   What is a physical? -- A physical is a routine visit, or \"check-up,\" with your doctor. You might also hear it called a \"wellness visit\" or \"preventive visit.\"  During each visit, the doctor will:   Ask about your physical and mental health   Ask about your habits, behaviors, and lifestyle   Do an exam   Give you vaccines if needed   Talk to you about any medicines you take   Give advice about your health   Answer your questions  Getting regular check-ups is an important part of taking care of your health. It can help your doctor find and treat any problems you have. But it's also important for preventing health problems.  A routine physical is different from a \"sick visit.\" A sick visit is when you see a doctor because of a health concern or problem. Since physicals are scheduled ahead of time, you can think about what you want to ask the doctor.  How often should I get a physical? -- It depends on your age and health. In general, for people age 21 years and older:   If you are younger than 50 years, you might be able to get a physical every 3 years.   If you are 50 years or older, your doctor might recommend a physical every year.  If you have an ongoing health condition, like diabetes or high blood pressure, your doctor will probably want to see you more often.  What happens during a physical? -- In general, each visit will include:   Physical exam - The doctor or nurse will " "check your height, weight, heart rate, and blood pressure. They will also look at your eyes and ears. They will ask about how you are feeling and whether you have any symptoms that bother you.   Medicines - It's a good idea to bring a list of all the medicines you take to each doctor visit. Your doctor will talk to you about your medicines and answer any questions. Tell them if you are having any side effects that bother you. You should also tell them if you are having trouble paying for any of your medicines.   Habits and behaviors - This includes:   Your diet   Your exercise habits   Whether you smoke, drink alcohol, or use drugs   Whether you are sexually active   Whether you feel safe at home  Your doctor will talk to you about things you can do to improve your health and lower your risk of health problems. They will also offer help and support. For example, if you want to quit smoking, they can give you advice and might prescribe medicines. If you want to improve your diet or get more physical activity, they can help you with this, too.   Lab tests, if needed - The tests you get will depend on your age and situation. For example, your doctor might want to check your:   Cholesterol   Blood sugar   Iron level   Vaccines - The recommended vaccines will depend on your age, health, and what vaccines you already had. Vaccines are very important because they can prevent certain serious or deadly infections.   Discussion of screening - \"Screening\" means checking for diseases or other health problems before they cause symptoms. Your doctor can recommend screening based on your age, risk, and preferences. This might include tests to check for:   Cancer, such as breast, prostate, cervical, ovarian, colorectal, prostate, lung, or skin cancer   Sexually transmitted infections, such as chlamydia and gonorrhea   Mental health conditions like depression and anxiety  Your doctor will talk to you about the different types of " screening tests. They can help you decide which screenings to have. They can also explain what the results might mean.   Answering questions - The physical is a good time to ask the doctor or nurse questions about your health. If needed, they can refer you to other doctors or specialists, too.  Adults older than 65 years often need other care, too. As you get older, your doctor will talk to you about:   How to prevent falling at home   Hearing or vision tests   Memory testing   How to take your medicines safely   Making sure that you have the help and support you need at home  All topics are updated as new evidence becomes available and our peer review process is complete.  This topic retrieved from [employer] on: May 02, 2024.  Topic 484096 Version 1.0  Release: 32.4.3 - C32.122  © 2024 UpToDate, Inc. and/or its affiliates. All rights reserved.  Consumer Information Use and Disclaimer   Disclaimer: This generalized information is a limited summary of diagnosis, treatment, and/or medication information. It is not meant to be comprehensive and should be used as a tool to help the user understand and/or assess potential diagnostic and treatment options. It does NOT include all information about conditions, treatments, medications, side effects, or risks that may apply to a specific patient. It is not intended to be medical advice or a substitute for the medical advice, diagnosis, or treatment of a health care provider based on the health care provider's examination and assessment of a patient's specific and unique circumstances. Patients must speak with a health care provider for complete information about their health, medical questions, and treatment options, including any risks or benefits regarding use of medications. This information does not endorse any treatments or medications as safe, effective, or approved for treating a specific patient. UpToDate, Inc. and its affiliates disclaim any warranty or liability  relating to this information or the use thereof.The use of this information is governed by the Terms of Use, available at https://www.wolterskluwer.com/en/know/clinical-effectiveness-terms. 2024© UpToDate, Inc. and its affiliates and/or licensors. All rights reserved.  Copyright   © 2024 [employer] and/or its affiliates. All rights reserved.

## 2024-08-01 NOTE — PATIENT INSTRUCTIONS
"Please get your labwork done fasting.  Do not eat/drink for about 8-12 hours prior to getting the labwork done, however you may drink water or black coffee while you are fasting.  Please use a [facility] lab if possible, as we receive the lab results more quickly.  We will notify you of your labwork results even if they are normal.  Please contact us if you do not hear about your lab results after one week.  Healthy low carb, high protein diet  Drink at least 100 ounces water daily.  Please call for sleep study.  Check childhood vaccines for HPV    Patient Education     Routine physical for adults   The Basics   Written by the doctors and editors at [facility]   What is a physical? -- A physical is a routine visit, or \"check-up,\" with your doctor. You might also hear it called a \"wellness visit\" or \"preventive visit.\"  During each visit, the doctor will:   Ask about your physical and mental health   Ask about your habits, behaviors, and lifestyle   Do an exam   Give you vaccines if needed   Talk to you about any medicines you take   Give advice about your health   Answer your questions  Getting regular check-ups is an important part of taking care of your health. It can help your doctor find and treat any problems you have. But it's also important for preventing health problems.  A routine physical is different from a \"sick visit.\" A sick visit is when you see a doctor because of a health concern or problem. Since physicals are scheduled ahead of time, you can think about what you want to ask the doctor.  How often should I get a physical? -- It depends on your age and health. In general, for people age 21 years and older:   If you are younger than 50 years, you might be able to get a physical every 3 years.   If you are 50 years or older, your doctor might recommend a physical every year.  If you have an ongoing health condition, like diabetes or high blood pressure, your doctor will probably want to see you more " "often.  What happens during a physical? -- In general, each visit will include:   Physical exam - The doctor or nurse will check your height, weight, heart rate, and blood pressure. They will also look at your eyes and ears. They will ask about how you are feeling and whether you have any symptoms that bother you.   Medicines - It's a good idea to bring a list of all the medicines you take to each doctor visit. Your doctor will talk to you about your medicines and answer any questions. Tell them if you are having any side effects that bother you. You should also tell them if you are having trouble paying for any of your medicines.   Habits and behaviors - This includes:   Your diet   Your exercise habits   Whether you smoke, drink alcohol, or use drugs   Whether you are sexually active   Whether you feel safe at home  Your doctor will talk to you about things you can do to improve your health and lower your risk of health problems. They will also offer help and support. For example, if you want to quit smoking, they can give you advice and might prescribe medicines. If you want to improve your diet or get more physical activity, they can help you with this, too.   Lab tests, if needed - The tests you get will depend on your age and situation. For example, your doctor might want to check your:   Cholesterol   Blood sugar   Iron level   Vaccines - The recommended vaccines will depend on your age, health, and what vaccines you already had. Vaccines are very important because they can prevent certain serious or deadly infections.   Discussion of screening - \"Screening\" means checking for diseases or other health problems before they cause symptoms. Your doctor can recommend screening based on your age, risk, and preferences. This might include tests to check for:   Cancer, such as breast, prostate, cervical, ovarian, colorectal, prostate, lung, or skin cancer   Sexually transmitted infections, such as chlamydia and " gonorrhea   Mental health conditions like depression and anxiety  Your doctor will talk to you about the different types of screening tests. They can help you decide which screenings to have. They can also explain what the results might mean.   Answering questions - The physical is a good time to ask the doctor or nurse questions about your health. If needed, they can refer you to other doctors or specialists, too.  Adults older than 65 years often need other care, too. As you get older, your doctor will talk to you about:   How to prevent falling at home   Hearing or vision tests   Memory testing   How to take your medicines safely   Making sure that you have the help and support you need at home  All topics are updated as new evidence becomes available and our peer review process is complete.  This topic retrieved from [employer] on: May 02, 2024.  Topic 705955 Version 1.0  Release: 32.4.3 - C32.122  © 2024 UpToDate, Inc. and/or its affiliates. All rights reserved.  Consumer Information Use and Disclaimer   Disclaimer: This generalized information is a limited summary of diagnosis, treatment, and/or medication information. It is not meant to be comprehensive and should be used as a tool to help the user understand and/or assess potential diagnostic and treatment options. It does NOT include all information about conditions, treatments, medications, side effects, or risks that may apply to a specific patient. It is not intended to be medical advice or a substitute for the medical advice, diagnosis, or treatment of a health care provider based on the health care provider's examination and assessment of a patient's specific and unique circumstances. Patients must speak with a health care provider for complete information about their health, medical questions, and treatment options, including any risks or benefits regarding use of medications. This information does not endorse any treatments or medications as safe,  effective, or approved for treating a specific patient. UpToDate, Inc. and its affiliates disclaim any warranty or liability relating to this information or the use thereof.The use of this information is governed by the Terms of Use, available at https://www.wolGeekStatusuwer.com/en/know/clinical-effectiveness-terms. 2024© [employer] and its affiliates and/or licensors. All rights reserved.  Copyright   © 2024 [employer] and/or its affiliates. All rights reserved.

## 2024-08-08 ENCOUNTER — TELEPHONE (OUTPATIENT)
Age: 29
End: 2024-08-08

## 2024-08-08 NOTE — TELEPHONE ENCOUNTER
"Patient is being sent for a sleep study. Advised patient its in \"pending status\". Advised patient will be contacted by the office after the referral has been reviewed.    Patient understood.    "

## 2024-08-12 DIAGNOSIS — R40.0 DAYTIME SLEEPINESS: ICD-10-CM

## 2024-08-12 DIAGNOSIS — R06.83 SNORING: ICD-10-CM

## 2024-08-12 DIAGNOSIS — R06.81 WITNESSED EPISODE OF APNEA: Primary | ICD-10-CM

## 2024-08-28 ENCOUNTER — HOSPITAL ENCOUNTER (OUTPATIENT)
Dept: SLEEP CENTER | Facility: CLINIC | Age: 29
Discharge: HOME | End: 2024-08-28
Payer: OTHER GOVERNMENT

## 2024-08-28 DIAGNOSIS — R40.0 DAYTIME SLEEPINESS: ICD-10-CM

## 2024-08-28 DIAGNOSIS — R06.81 WITNESSED EPISODE OF APNEA: ICD-10-CM

## 2024-08-28 DIAGNOSIS — R06.83 SNORING: ICD-10-CM

## 2024-08-28 PROCEDURE — 95810 POLYSOM 6/> YRS 4/> PARAM: CPT | Performed by: INTERNAL MEDICINE

## 2024-08-28 PROCEDURE — 95810 POLYSOM 6/> YRS 4/> PARAM: CPT

## 2024-08-29 PROBLEM — G47.33 OSA (OBSTRUCTIVE SLEEP APNEA): Status: ACTIVE | Noted: 2024-08-29

## 2024-08-29 NOTE — PROGRESS NOTES
Sleep Study Documentation    Pre-Sleep Study       Sleep testing procedure explained to patient:YES    Patient napped prior to study:NO    Caffeine:Dayshift worker after 12PM.  Caffeine use:NO    Alcohol:Dayshift workers after 5PM: Alcohol use:NO    Typical day for patient:YES       Study Documentation    Sleep Study Indications: loud snoring, choking, gasping, witnessed apnea, EDS    Sleep Study: Diagnostic   Snore:Severe  Supplemental O2: no    O2 flow rate (L/min) range   O2 flow rate (L/min) final   Minimum SaO2 75%  Baseline SaO2 96%    Mode of Therapy:    EKG abnormalities: no     EEG abnormalities: no    Were abnormal behaviors in sleep observed:NO    Is Total Sleep Study Recording Time < 2 hours: N/A    Is Total Sleep Study Recording Time > 2 hours but study is incomplete: N/A    Is Total Sleep Study Recording Time 6 hours or more but sleep was not obtained: NO    Patient classification: employed       Post-Sleep Study    Medication used at bedtime or during sleep study:NO    Patient reports time it took to fall asleep:20 to 30 minutes    Patient reports waking up during study:3 or more times.  Patient reports returning to sleep without difficulty.    Patient reports sleeping 4 to 6 hours without dreaming.    Does the Patient feel this is a typical night of sleep:better than usual    Patient rated sleepiness: Not sleepy or tired    PAP treatment:no.

## 2024-09-09 ENCOUNTER — TELEPHONE (OUTPATIENT)
Age: 29
End: 2024-09-09

## 2024-09-09 NOTE — TELEPHONE ENCOUNTER
Pt would like to know what his next steps are after having his recent sleep study and if he needs a CPAP. Please advise

## 2024-09-10 ENCOUNTER — TELEPHONE (OUTPATIENT)
Dept: SLEEP CENTER | Facility: CLINIC | Age: 29
End: 2024-09-10

## 2024-09-10 DIAGNOSIS — R06.81 WITNESSED EPISODE OF APNEA: ICD-10-CM

## 2024-09-10 DIAGNOSIS — R40.0 DAYTIME SLEEPINESS: Primary | ICD-10-CM

## 2024-09-10 DIAGNOSIS — G47.33 OBSTRUCTIVE SLEEP APNEA SYNDROME: ICD-10-CM

## 2024-09-10 NOTE — TELEPHONE ENCOUNTER
Diagnostic sleep study resulted and shows moderate sleep apnea 18.5.  Referral to sleep medicine made by PCP, Dr. López.     Return call to patient who has read results in [facility] and is wondering about next steps.       Call to patient reviewed results and referral for consult.     Consultation with Dr. Hernandez scheduled for 9/23/2024 @ 2:10 pm in the [place] office. Patient questionnaire sent to patient.

## 2024-09-23 ENCOUNTER — OFFICE VISIT (OUTPATIENT)
Dept: SLEEP CENTER | Facility: CLINIC | Age: 29
End: 2024-09-23
Payer: OTHER GOVERNMENT

## 2024-09-23 VITALS
HEART RATE: 65 BPM | HEIGHT: 73 IN | OXYGEN SATURATION: 97 % | SYSTOLIC BLOOD PRESSURE: 128 MMHG | BODY MASS INDEX: 31.23 KG/M2 | DIASTOLIC BLOOD PRESSURE: 82 MMHG | WEIGHT: 235.6 LBS

## 2024-09-23 DIAGNOSIS — R40.0 DAYTIME SLEEPINESS: ICD-10-CM

## 2024-09-23 DIAGNOSIS — G47.33 OBSTRUCTIVE SLEEP APNEA SYNDROME: Primary | ICD-10-CM

## 2024-09-23 DIAGNOSIS — E66.09 CLASS 1 OBESITY DUE TO EXCESS CALORIES WITHOUT SERIOUS COMORBIDITY WITH BODY MASS INDEX (BMI) OF 31.0 TO 31.9 IN ADULT: ICD-10-CM

## 2024-09-23 DIAGNOSIS — E66.811 CLASS 1 OBESITY DUE TO EXCESS CALORIES WITHOUT SERIOUS COMORBIDITY WITH BODY MASS INDEX (BMI) OF 31.0 TO 31.9 IN ADULT: ICD-10-CM

## 2024-09-23 DIAGNOSIS — R06.83 SNORING: ICD-10-CM

## 2024-09-23 DIAGNOSIS — R06.81 WITNESSED EPISODE OF APNEA: ICD-10-CM

## 2024-09-23 PROCEDURE — 99244 OFF/OP CNSLTJ NEW/EST MOD 40: CPT | Performed by: INTERNAL MEDICINE

## 2024-09-23 NOTE — PROGRESS NOTES
Sleep Consultation   Rolly Hernandez III 29 y.o. male MRN: [id number]      Reason for consultation: RADHA    Requesting physician: Dr. López    Assessment/Plan    1. Obstructive sleep apnea syndrome  Moderate sleep apnea with more REM specific AHI recent diagnosed  History of loud snoring excessive daytime sleepiness witnessed apneas and choking.  Would like to start a treatment immediately he is amenable to try CPAP therapy.  I explained to the patient in details the pathophysiology of obstructive sleep apnea.  I also explained the importance of treatment, and the consequences of untreated obstructive sleep apnea on underlying cardiovascular and cerebrovascular risk, morbidity, and mortality.      - Ambulatory Referral to Sleep Medicine  - CPAP Auto New DME    2. Daytime sleepiness  Start APAP trial  - Ambulatory Referral to Sleep Medicine  - CPAP Auto New DME    3. Witnessed episode of apnea    - Ambulatory Referral to Sleep Medicine  - CPAP Auto New DME    4. Snoring  2/2 untreated RADHA    5. Class 1 obesity due to excess calories without serious comorbidity with body mass index (BMI) of 31.0 to 31.9 in adult  BMI 31.08  Noted he would benefit from weight loss           History of Present Illness   HPI:  Rolly Hernandez III is a 29 y.o. male with PMHx as below who comes in for evaluation of recent diagnosed moderate RADHA he was told to get that sleep test as his girlfriend who is a registered nurse witnessed that he snores loudly has apneas and choking episodes and he has reported excessive daytime sleepiness and requiring daytime nap every day.    Underwent a home sleep study found to have moderate RADHA as detailed below    Sleep routine:  What time do you typically go to bed weekdays or workdays? 10:00 pm   What time do you typically go to bed weekends or days off? 10:00 pm   How long does it take to fall asleep? 45-60 minutes   How often is it hard for you to fall asleep? 1-2 times per week   How many times do you  remember waking up during your sleep time? 3 to 4 times per night   What wakes you up? Unsure   How often is it hard for you to return to sleep after awakening in the middle of the night? 1-2 times per week   What time do you wake up for the last time on Weekdays or Workdays? 5:30 am   What time do you wake up for the last time on weekends? 7:00 am   How do you wake up for the final time to start your day? With aid of an alarm   How many hours do you sleep on average? 7   Do you typically feel refreshed when you first awaken? Sometimes   Are you sleepy during the day? Sometimes   Do you intentionally try to nap? Yes   How long do you sleep during a nap? 30-60 minutes   How often do you nap?    How often do you doze (fall asleep without intending to)? Most days       [place] scale:  Sitting and reading: Slight chance of dozing  Watching TV: Slight chance of dozing  Sitting, inactive in a public place (e.g. a theatre or a meeting): Slight chance of dozing  As a passenger in a car for an hour without a break: Slight chance of dozing  Lying down to rest in the afternoon when circumstances permit: Moderate chance of dozing  Sitting and talking to someone: Slight chance of dozing  Sitting quietly after a lunch without alcohol: Slight chance of dozing  In a car, while stopped for a few minutes in traffic: Would never doze  Total score: 8          Historical Information   Past Medical History:   Diagnosis Date    Medical history non-contributory      Past Surgical History:   Procedure Laterality Date    HAND SURGERY      NO PAST SURGERIES       Family History   Problem Relation Age of Onset    No Known Problems Father     Thyroid cancer Sister     Heart disease Maternal Grandmother     Heart disease Maternal Grandfather     Cancer Paternal Grandmother         throat    No Known Problems Paternal Grandfather     Heart disease Family      Social History     Socioeconomic History    Marital status: Single     Spouse name: Not  on file    Number of children: Not on file    Years of education: Not on file    Highest education level: Not on file   Occupational History    Not on file   Tobacco Use    Smoking status: Never    Smokeless tobacco: Never   Vaping Use    Vaping status: Never Used   Substance and Sexual Activity    Alcohol use: Not Currently    Drug use: No    Sexual activity: Yes     Partners: Female     Birth control/protection: I.U.D.   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    Getting masters in HR management        Grew up [place]    11yo- moved to [place]    Graduated from [employer] 2015    Parents retired and moved to [place]    Sister in [facility]- works as juvenile crisis therapist     Social Determinants of Health     Financial Resource Strain: Not on file   Food Insecurity: Not on file   Transportation Needs: Not on file   Physical Activity: Not on file   Stress: Not on file   Social Connections: Unknown (6/18/2024)    Received from [employer]     How often do you feel lonely or isolated from those around you? (Adult - for ages 18 years and over): Not on file   Intimate Partner Violence: Not on file   Housing Stability: Not on file       Occupational History: [employer]    Meds/Allergies   No Known Allergies    Home medications:  Prior to Admission medications    Medication Sig Start Date End Date Taking? Authorizing Provider   ibuprofen (MOTRIN) 600 mg tablet Take 1 tablet by mouth every 6 (six) hours as needed for mild pain 7/1/17  Yes Danny Dai Jr., DO   Multiple Vitamin (MULTIVITAMIN) tablet Take 1 tablet by mouth daily   Yes Historical Provider, MD   albuterol (PROVENTIL HFA,VENTOLIN HFA) 90  "mcg/act inhaler Inhale 2 puffs every 4 (four) hours as needed  Patient not taking: Reported on 9/23/2024 3/18/24   Historical Provider, MD   methocarbamol (ROBAXIN) 500 mg tablet Take 1 tablet (500 mg total) by mouth 2 (two) times a day as needed for muscle spasms  Patient not taking: Reported on 9/23/2024 1/11/21   Rolly Hickman MD       Vitals:   Blood pressure 128/82, pulse 65, height 6' 1\" (1.854 m), weight 107 kg (235 lb 9.6 oz), SpO2 97%.,  Body mass index is 31.08 kg/m².  Neck Circumference: 17.25    Physical Exam  General:  Awake alert and oriented x 3, conversant without conversational dyspnea, NAD, normal affect  HEENT:   Sclera noninjected, nonicteric OU, Nares patent,  no craniofacial abnormalities  NECK:  Trachea midline, no accessory muscle use, no stridor  PULM: no accessory muscle use  EXT: No cyanosis, no clubbing, no edema  NEURO: no focal neurologic deficits, AAOx3, moving all extremities appropriately    Labs: I have personally reviewed pertinent lab results., ABG: No results found for: \"PHART\", \"UJG2HXH\", \"PO2ART\", \"MZZ2BVE\", \"N0IKBWOS\", \"BEART\", \"SOURCE\", BNP: No results found for: \"BNP\", CBC: No results found for: \"WBC\", \"HGB\", \"HCT\", \"MCV\", \"PLT\", \"ADJUSTEDWBC\", \"RBC\", \"MCH\", \"MCHC\", \"RDW\", \"MPV\", \"NRBC\", CMP: No results found for: \"SODIUM\", \"K\", \"CL\", \"CO2\", \"ANIONGAP\", \"BUN\", \"CREATININE\", \"GLUCOSE\", \"CALCIUM\", \"AST\", \"ALT\", \"ALKPHOS\", \"PROT\", \"BILITOT\", \"EGFR\", PT/INR: No results found for: \"PT\", \"INR\", Troponin: No results found for: \"TROPONINI\"  Lab Results   Component Value Date    WBC 5.2 08/05/2020    HGB 14.8 08/05/2020    HCT 44.2 08/05/2020    MCV 82 08/05/2020     08/05/2020      Lab Results   Component Value Date    K 4.4 08/05/2020    CO2 25 08/05/2020    CL 99 08/05/2020    BUN 12 08/05/2020    CREATININE 0.96 08/05/2020     No results found for: \"IRON\", \"TIBC\", \"FERRITIN\"  No results found for: \"BRFMFZWR19\"  No results found for: \"FOLATE\"      Sleep studies:  AHI 18 --> " "moderate RADHA       Mandeep Hernandez MD  [facility]       Portions of the record may have been created with voice recognition software. Occasional wrong word or \"sound a like\" substitutions may have occurred due to the inherent limitations of voice recognition software. Read the chart carefully and recognize, using context, where substitutions have occurred.  "

## 2024-09-23 NOTE — PATIENT INSTRUCTIONS
"Patient Education     Sleep apnea in adults   The Basics   Written by the doctors and editors at [facility]   What is sleep apnea? -- Sleep apnea is a condition that makes you stop breathing for short periods while you are asleep. There are 2 types of sleep apnea. One is called \"obstructive sleep apnea.\" The other is called \"central sleep apnea.\"  In obstructive sleep apnea, you stop breathing because your throat narrows or closes (figure 1). In central sleep apnea, you stop breathing because your brain does not send the right signals to your muscles to make you breathe. When people talk about sleep apnea, they are usually referring to obstructive sleep apnea, which is what this article is about.  People with sleep apnea do not know that they stop breathing when they are asleep. But they do sometimes wake up startled or gasping for breath. They also often hear from loved ones that they snore.  What are the symptoms of sleep apnea? -- The main symptoms of sleep apnea are loud snoring, tiredness, and daytime sleepiness. Other symptoms can include:   Restless sleep   Waking up choking or gasping   Morning headaches, dry mouth, or sore throat   Waking up often to urinate   Waking up feeling unrested or groggy   Trouble thinking clearly or remembering things  Some people with sleep apnea don't have symptoms, or don't realize that they have them.  Should I see a doctor or nurse? -- Yes. If you think that you might have sleep apnea, see your doctor.  Is there a test for sleep apnea? -- Yes. First, your doctor or nurse will ask about your symptoms. If you have a bed partner, they might also ask that person if you snore or gasp in your sleep. If the doctor or nurse suspects that you have sleep apnea, they might send you for a \"sleep study.\"  Sleep studies can sometimes be done at home, but they are usually done in a sleep lab. For the study, you spend the night in the lab, and you are hooked up to different machines that " "monitor your heart rate, breathing, and other body functions. The results of the test tell your doctor or nurse if you have the disorder.  Is there anything I can do on my own to help my sleep apnea? -- Yes. Some things that might help:   Try to avoid sleeping on your back, if possible. This might help some people.   Lose weight, if you have excess body weight.   Get regular physical activity. This might help you lose weight. But even if it doesn't, being active is good for your health.   Avoid alcohol, especially in the evening. Alcohol can make sleep apnea worse.  How is sleep apnea treated? -- Treatment can include:   Weight loss - As mentioned above, weight loss can help if you have excess weight or obesity. But losing weight can be challenging, and it takes time to lose enough weight to help with your sleep apnea. Most people need other treatment while they work on losing weight.   CPAP - The most effective treatment for sleep apnea is a device that keeps your airway open while you sleep. Treatment with this device is called \"continuous positive airway pressure\" (\"CPAP\"). People getting CPAP wear a face mask at night that keeps them breathing (figure 2).  If your doctor or nurse recommends a CPAP machine, be patient about using it. The mask might seem uncomfortable to wear at first, and the machine might seem noisy, but using the machine can really help you. People with sleep apnea who use a CPAP machine feel more rested and generally feel better.  If CPAP does not work, your doctor might suggest other treatment. Options might include:   An oral device - This is a device that you wear in your mouth. It is called an \"oral appliance\" or \"mandibular advancement device.\" It helps keep your airway open while you sleep.   Hypoglossal nerve stimulation - This involves a procedure to implant a small device into your chest. The device has a wire that connects to the nerve under your tongue. While you are sleeping, it " sends an electrical signal that causes the tongue to push forward. This helps open up your airway.   Surgery to widen your airway - This might involve removing your tonsils or other tissue that blocks the airway.  Is sleep apnea dangerous? -- It can be. Risks include:   Accidents - People with sleep apnea do not get good-quality sleep, so they are often tired and not alert. This puts them at risk for car accidents and other types of accidents.   Other health problems - Studies show that people with sleep apnea are more likely than others to have high blood pressure, heart attacks, and other serious heart problems. Some people also have mood changes or depression.  In people with severe sleep apnea, getting treated (for example, with CPAP) can help lower these risks.  All topics are updated as new evidence becomes available and our peer review process is complete.  This topic retrieved from [employer] on: Feb 28, 2024.  Topic 35578 Version 18.0  Release: 32.2.4 - C32.58  © 2024 UpToDate, Inc. and/or its affiliates. All rights reserved.  figure 1: Airway in a person with sleep apnea     Normally, when a person sleeps, the airway remains open, and air can pass from the nose and mouth to the lungs. In a person with sleep apnea, parts of the throat and mouth drop into the airway and block off the flow of air. This can cause loud snoring and interrupt breathing for short periods.  Graphic 56383 Version 6.0  figure 2: Continuous positive airway pressure (CPAP) for sleep apnea     The CPAP mask gently blows air into your nose while you sleep. It puts just enough pressure on your airway to keep it from closing. The mask in this picture fits over just the nose. Other CPAP devices have masks that fit over the nose and mouth.  Graphic 25863 Version 5.0  Consumer Information Use and Disclaimer   Disclaimer: This generalized information is a limited summary of diagnosis, treatment, and/or medication information. It is not meant to  be comprehensive and should be used as a tool to help the user understand and/or assess potential diagnostic and treatment options. It does NOT include all information about conditions, treatments, medications, side effects, or risks that may apply to a specific patient. It is not intended to be medical advice or a substitute for the medical advice, diagnosis, or treatment of a health care provider based on the health care provider's examination and assessment of a patient's specific and unique circumstances. Patients must speak with a health care provider for complete information about their health, medical questions, and treatment options, including any risks or benefits regarding use of medications. This information does not endorse any treatments or medications as safe, effective, or approved for treating a specific patient. UpToDate, Inc. and its affiliates disclaim any warranty or liability relating to this information or the use thereof.The use of this information is governed by the Terms of Use, available at https://www.woltersDigitalMRuwer.com/en/know/clinical-effectiveness-terms. 2024© UpToDate, Inc. and its affiliates and/or licensors. All rights reserved.  Copyright   © 2024 UpToDate, Inc. and/or its affiliates. All rights reserved.

## 2024-09-26 ENCOUNTER — TELEPHONE (OUTPATIENT)
Dept: SLEEP CENTER | Facility: CLINIC | Age: 29
End: 2024-09-26

## 2024-10-04 LAB

## 2024-10-10 ENCOUNTER — TELEPHONE (OUTPATIENT)
Dept: SLEEP CENTER | Facility: CLINIC | Age: 29
End: 2024-10-10

## 2024-10-10 LAB
DME PARACHUTE DELIVERY DATE ACTUAL: NORMAL
DME PARACHUTE DELIVERY DATE EXPECTED: NORMAL
DME PARACHUTE DELIVERY DATE REQUESTED: NORMAL
DME PARACHUTE ITEM DESCRIPTION: NORMAL
DME PARACHUTE ORDER STATUS: NORMAL
DME PARACHUTE SUPPLIER NAME: NORMAL
DME PARACHUTE SUPPLIER PHONE: NORMAL

## 2024-12-11 ENCOUNTER — OFFICE VISIT (OUTPATIENT)
Dept: PULMONOLOGY | Facility: CLINIC | Age: 29
End: 2024-12-11
Payer: OTHER GOVERNMENT

## 2024-12-11 VITALS
HEART RATE: 62 BPM | BODY MASS INDEX: 32.47 KG/M2 | SYSTOLIC BLOOD PRESSURE: 143 MMHG | WEIGHT: 245 LBS | HEIGHT: 73 IN | TEMPERATURE: 98.4 F | DIASTOLIC BLOOD PRESSURE: 88 MMHG | OXYGEN SATURATION: 98 %

## 2024-12-11 DIAGNOSIS — G47.33 OSA (OBSTRUCTIVE SLEEP APNEA): Primary | ICD-10-CM

## 2024-12-11 DIAGNOSIS — E66.811 CLASS 1 OBESITY: ICD-10-CM

## 2024-12-11 DIAGNOSIS — J35.1 TONSILLAR HYPERTROPHY: ICD-10-CM

## 2024-12-11 LAB

## 2024-12-11 PROCEDURE — 99213 OFFICE O/P EST LOW 20 MIN: CPT

## 2024-12-11 NOTE — PROGRESS NOTES
[facility]  Sleep Medicine Follow Up/Established Patient    PATIENT NAME: Rolly Hernandez III  DATE OF SERVICE: December 11, 2024  DATE OF LAST VISIT: 9/23/2024    ASSESSMENT/PLAN:  Rolly Hernandez III is a 29 y.o. male with PMHx of RADHA on CPAP and obesity who returns to the office for follow up.    RADHA (Obstructive Sleep Apnea)  Class 1 Obesity  - The patient has a history of moderate RADHA exacerbated to severe during REM sleep diagnosed on PSG in 2024 (AHI 18.5, supine AHI 18.5, REM AHI 34.5, O2 mendoza 75%, TST <90% 7.0 min) and is currently on auto CPAP 5-15 cmH2O, he is presenting today for his initial compliance follow-up.  He has had some improvement with CPAP, but notes continued snoring and does not wake up completely refreshed at times, on download he is using up to max pressure currently.  - Therapy and compliance data reviewed: residual AHI 3.1, compliance 100% and no significant mask leak noted  - Will adjust pressures to APAP 10-20 cmH2O with a full-face mask.  - Refill of supplies will be sent to the patient's DME.  - Explained the importance of keeping the machine clean, and that they should be eligible for refills of supplies every 3-6 months depending on insurance.  We also discussed the insurance compliance requirements.  - Encouraged healthy lifestyle with adequate sleep (7-9 hours per night), healthy balanced diet and routine exercise.  Explained the importance of avoiding driving while drowsy.  Weight loss is recommended.  - Follow-up in 4 months  -     PAP DME Resupply/Reorder  -     PAP DME Pressure Change    Tonsillar Hypertrophy  - The patient has 2+ tonsils on exam today and notes frequent tonsillitis, he is questioning if he can have tonsillectomy.  Will refer to ENT for further evaluation.  -     Ambulatory Referral to Otolaryngology; Future    ________________________________________________________________________________________________    Interval History: Rolly  David BAE is a 29 y.o. male with PMHx of RADHA on CPAP and obesity who returns to the office for follow up.  He reports he has been doing fairly well on the CPAP and has had no issues with acclamation.  He does still note that he will snore at times through his mask particularly in the second half of the night and while he wakes up feeling refreshed he notes there are some mornings where he struggles.  He does also still wake up 1-2 times a night for unclear reasons at times.  He denies any issues with aerophagia, poor mask fit, mask leak, rainout or dry mouth, but does state that he feels his pressures at the beginning of the night are too low.    PAP History  Sleep Apnea Type: RADHA  Most Recent AHI: 18.5 in 2024  Treatment: APAP    DME: [employer]    Uses APAP for 7.5-8 hours per night, 7 nights per week.  Current PAP Settings: 5-15 cmH2O  Compliance Data: 100%, see below    Mask Type: full-face  PAP Issues: still has snoring, notes starting pressures feel too low  Uses Chin Strap: No  Uses Ramp Function: Yes   Uses Humidity: Yes     There is a perceived benefit by the patient: does feel more refreshed, but feels he could be better  Observers report continues to have snoring with APAP use.    Prior History: The patient has been following with [facility] sleep medicine since 9/2024 after he had a PSG which showed moderate RADHA (AHI 18.5, supine AHI 18.5, REM AHI 34.5, O2 mendoza 75%, TST <90% 7.0 min).  At his initial visit he was ordered auto CPAP 5-15 cmH2O and noted significant daytime symptoms.  He is now returning for initial compliance follow-up.    ESS: 6/24  Greater or equal to 10 is positive for excessive daytime sleepiness  Pertinent Meds: None    Sleep-Wake Schedule:  Bedtime: 2130  Wake Time: 0600  Difficulty Falling Asleep: No  Avg Number of Awakenings: 1-2x a night  Cause of Awakenings: unclear reasons  Weekend Sleep Schedule: 5710-5875  Naps: denies    Avg TST per 24 hours: 7 hours    Past  Treatments:  APAP 5-15 cmH2O    Prior Sleep Studies:  PSG -- 8/28/2024 (Wt 229.0 lbs)  AHI - 18.5  Sup AHI - 18.5  REM AHI - 34.5  O2 Michael - 75.0%  TST < 90% - 7.0 min  PLMI - 0.3  PLM Zeb - 0.2    Other Relevant Labs and Studies:  Therapy and Compliance Data -- 11/11/2024 - 12/10/2024      Past Medical History:   Diagnosis Date    Medical history non-contributory      Past Surgical History:   Procedure Laterality Date    HAND SURGERY      NO PAST SURGERIES       Patient Active Problem List   Diagnosis    Viral meningitis    Snoring    RADHA (obstructive sleep apnea)    Obstructive sleep apnea syndrome    Daytime sleepiness    Witnessed episode of apnea    Class 1 obesity due to excess calories without serious comorbidity with body mass index (BMI) of 31.0 to 31.9 in adult     Allergies as of 12/11/2024    (No Known Allergies)     REVIEW OF SYSTEMS:  Review of Systems  10-point system review completed, all of which are negative except as mentioned above.    CURRENT MEDICATIONS:  Current Outpatient Medications   Medication Instructions    albuterol (PROVENTIL HFA,VENTOLIN HFA) 90 mcg/act inhaler 2 puffs, Every 4 hours PRN    ibuprofen (MOTRIN) 600 mg, Oral, Every 6 hours PRN    methocarbamol (ROBAXIN) 500 mg, Oral, 2 times daily PRN    Multiple Vitamin (MULTIVITAMIN) tablet 1 tablet, Daily     SOCIAL HISTORY:  Social History     Tobacco Use    Smoking status: Never    Smokeless tobacco: Never   Vaping Use    Vaping status: Never Used   Substance Use Topics    Alcohol use: Not Currently    Drug use: No     FAMILY HISTORY:  Family History   Problem Relation Age of Onset    No Known Problems Father     Thyroid cancer Sister     Heart disease Maternal Grandmother     Heart disease Maternal Grandfather     Cancer Paternal Grandmother         throat    No Known Problems Paternal Grandfather     Heart disease Family      PHYSICAL EXAMINATION:  Vital Signs:  /88   Pulse 62   Temp 98.4 °F (36.9 °C) (Temporal)   Ht 6'  "1\" (1.854 m)   Wt 111 kg (245 lb)   SpO2 98%   BMI 32.32 kg/m²   Body mass index is 32.32 kg/m².  Constitutional: NAD, well appearing, obese   Mental Status: AAOx3  Skin: Warm, dry, no rashes noted   Eyes: PERRL, normal conjunctiva  ENT: Nasal congestion absent, nasal valve incompetence absent.  Posterior Airspace:   Connolly Tongue Position: 2  Retrognathia: absent  Overbite: absent  High Arched Palate: absent  Tongue Scalloping/Ridging: absent  Tonsils: 2+  Uvula: normal  Chest: RRR, +S1/S2, CTA B/L, no W/R/R, no M/R/G   Extremities: No digital clubbing or pedal edema      Bee Ng MD  Pulmonary-Critical Care and Sleep Medicine  12/11/24    Portions of the record may have been created with voice recognition software. Occasional wrong word or \"sound a like\" substitutions may have occurred due to the inherent limitations of voice recognition software. Please read the chart carefully and recognize, using context, where substitutions have occurred.   "

## 2024-12-11 NOTE — PATIENT INSTRUCTIONS
Continue PAP Therapy  - Continue APAP at 10-20 cmH2O.  Remember to clean your mask and equipment regularly, as directed.  You should be eligible for new supplies approximately every 3-6 months, depending on your insurance coverage. Contact your Durable Medical Equipment (DME) company for new supplies as needed.  Follow up in 4 months    Care and Maintenance  Headgear should be washed as needed. Daily inspection and weekly washings are recommended. Do not disassemble the straps. Machine wash in warm water, making sure to attach Velcro hooks and tabs before washing. Line dry or machine dry on a low setting.  Masks should be washed every day. Daily inspection is recommended. Leave the mask and tubing attached. Gently wash the mask with a soft cloth using warm water and mild detergent, concentrating on the mask cushion flaps. DO NOT use alcohol or bleach. Rinse thoroughly and air dry.  Tubing and headgear should be washed weekly. Daily inspection is recommended. Wash in warm water and mild detergent and rinse thoroughly. Hook the tubing to the machine and blow until dry.  Humidifier should be washed daily and filled with DISTILLED water before use. Wash with warm water and mild detergent. Disinfect weekly by soaking with a solution of 1 part white vinegar and 3 parts water for 30 minutes. Rinse thoroughly and air dry.  Disposable filters should be replaced once a month. Wash reusable foam filters with warm water and mild detergent at least once a month. Rinse thoroughly and dry with paper towels.  Avoid  that contain fragrance or conditioners, as these will leave a residue.  NEVER iron any soft goods.    Insurance Requirements  Your insurance requires a face-to-face follow up visit within a 31-90 day period after starting PAP therapy.  Your insurance requires compliance with your PAP device, which is at least 4 hours per night for 70% of the time. This must be done over a 30 day period and must occur within the  ~3rd attempt: Called pt, no answer, lvm and asked to call back regarding refill and due for a appt. Will attempt to call back at a later time.    initial 31-90 day period after starting CPAP.  Your insurance also requires at least yearly follow ups to continue to pay for CPAP supplies.     PAP Supply Guidelines  Below are the guidelines for reordering your supplies. You will be responsible for your deductible, co payments, and out of pocket expenses.    Item Frequency   Nasal Mask (no headgear) 1 every 3 months   Nasal Mask Cushion 1 every 2 weeks   Full Face Mask (no headgear) 1 every 3 months   Full Face Mask Cushion 1 every month   Nasal Pillows 1 every 2 weeks   Headgear 1 every 6 months   hin Strap 1 every 6 months   francisco 1 every 3 months   Filters: Reusable 1 every 6 months   Filters: Disposable 1 every 2 weeks   Humidifier Chamber(disposable) 1 every 6 months

## 2024-12-12 ENCOUNTER — TELEPHONE (OUTPATIENT)
Dept: SLEEP CENTER | Facility: CLINIC | Age: 29
End: 2024-12-12

## 2024-12-12 LAB
DME PARACHUTE DELIVERY DATE ACTUAL: NORMAL
DME PARACHUTE DELIVERY DATE REQUESTED: NORMAL
DME PARACHUTE ITEM DESCRIPTION: NORMAL
DME PARACHUTE ORDER STATUS: NORMAL
DME PARACHUTE SUPPLIER NAME: NORMAL
DME PARACHUTE SUPPLIER PHONE: NORMAL

## 2024-12-12 NOTE — TELEPHONE ENCOUNTER
Call to patient, scheduled for compliance follow up with Dr. Ng in the [place] office on 4/9/2025 @ 12:50 PM.

## 2024-12-12 NOTE — TELEPHONE ENCOUNTER
----- Message from Bee Ng MD sent at 12/11/2024  9:55 AM EST -----  Can this patient please be scheduled for 4-month follow-up in the [place] sleep office, thanks.

## 2024-12-17 LAB
DME PARACHUTE DELIVERY DATE REQUESTED: NORMAL
DME PARACHUTE ITEM DESCRIPTION: NORMAL
DME PARACHUTE ORDER STATUS: NORMAL
DME PARACHUTE SUPPLIER NAME: NORMAL
DME PARACHUTE SUPPLIER PHONE: NORMAL

## 2025-02-11 ENCOUNTER — OFFICE VISIT (OUTPATIENT)
Dept: URGENT CARE | Facility: CLINIC | Age: 30
End: 2025-02-11
Payer: OTHER GOVERNMENT

## 2025-02-11 VITALS
RESPIRATION RATE: 18 BRPM | HEIGHT: 73 IN | DIASTOLIC BLOOD PRESSURE: 86 MMHG | HEART RATE: 74 BPM | OXYGEN SATURATION: 97 % | WEIGHT: 246.6 LBS | TEMPERATURE: 97.5 F | SYSTOLIC BLOOD PRESSURE: 156 MMHG | BODY MASS INDEX: 32.68 KG/M2

## 2025-02-11 DIAGNOSIS — J06.9 UPPER RESPIRATORY TRACT INFECTION, UNSPECIFIED TYPE: Primary | ICD-10-CM

## 2025-02-11 PROCEDURE — 99213 OFFICE O/P EST LOW 20 MIN: CPT

## 2025-02-11 PROCEDURE — G0463 HOSPITAL OUTPT CLINIC VISIT: HCPCS

## 2025-02-11 RX ORDER — BENZONATATE 200 MG/1
200 CAPSULE ORAL 3 TIMES DAILY PRN
Qty: 20 CAPSULE | Refills: 0 | Status: SHIPPED | OUTPATIENT
Start: 2025-02-11

## 2025-02-11 RX ORDER — AZITHROMYCIN 250 MG/1
TABLET, FILM COATED ORAL
Qty: 6 TABLET | Refills: 0 | Status: SHIPPED | OUTPATIENT
Start: 2025-02-11 | End: 2025-02-15

## 2025-02-11 RX ORDER — PREDNISONE 20 MG/1
40 TABLET ORAL DAILY
Qty: 10 TABLET | Refills: 0 | Status: SHIPPED | OUTPATIENT
Start: 2025-02-11 | End: 2025-02-16

## 2025-02-11 NOTE — PROGRESS NOTES
[facility]        NAME: Rolly Hernandez III is a 29 y.o. male  : 1995    MRN: [id number]  DATE: 2025  TIME: 12:50 PM    Assessment and Plan   Upper respiratory tract infection, unspecified type [J06.9]  1. Upper respiratory tract infection, unspecified type  azithromycin (ZITHROMAX) 250 mg tablet    predniSONE 20 mg tablet    benzonatate (TESSALON) 200 MG capsule            Patient Instructions       Take full course of Azithromycin as prescribed  Eat yogurt with live and active cultures and/or take a probiotic at least 3 hours before or after antibiotic dose. Monitor stool for diarrhea and/or blood. If this occurs, contact primary care doctor ASAP.   Take prednisone as prescribed - take in morning with food  Take tessalon perles as prescribed for cough.     Take over the counter Mucinex during the day  Take over the counter cough suppressant at night  Fluids and rest (Warm water with honey and lemon)  Tylenol/Ibuprofen for pain fever    Follow up with PCP in 3-5 days.  Proceed to  ER if symptoms worsen.    If tests are performed, our office will contact you with results only if changes need to made to the care plan discussed with you at the visit. You can review your full results on [facility].    Chief Complaint     Chief Complaint   Patient presents with    Cold Like Symptoms     Cough and congestion x 3 days          History of Present Illness       29 year old male arrives reporting cough and congestion ongoing for past 4 days with chills. Patient reports cough is productive and has been taking over the counter medication without relief. Patient denies any current SOB, CP or abdominal pain.     Cough  This is a new problem. The current episode started in the past 7 days. The problem has been unchanged. The problem occurs constantly. The cough is Productive of sputum. Associated symptoms include chills, nasal congestion and postnasal drip. Pertinent negatives include no  chest pain, ear congestion, ear pain, fever, headaches, heartburn, hemoptysis, myalgias, rash, rhinorrhea, sore throat, shortness of breath, sweats, weight loss or wheezing.       Review of Systems   Review of Systems   Constitutional:  Positive for chills. Negative for fever and weight loss.   HENT:  Positive for congestion and postnasal drip. Negative for ear pain, rhinorrhea and sore throat.    Respiratory:  Positive for cough. Negative for hemoptysis, shortness of breath and wheezing.    Cardiovascular: Negative.  Negative for chest pain.   Gastrointestinal: Negative.  Negative for heartburn.   Musculoskeletal: Negative.  Negative for myalgias.   Skin:  Negative for rash.   Neurological:  Negative for headaches.         Current Medications       Current Outpatient Medications:     azithromycin (ZITHROMAX) 250 mg tablet, Take 2 tablets today then 1 tablet daily x 4 days, Disp: 6 tablet, Rfl: 0    benzonatate (TESSALON) 200 MG capsule, Take 1 capsule (200 mg total) by mouth 3 (three) times a day as needed for cough, Disp: 20 capsule, Rfl: 0    predniSONE 20 mg tablet, Take 2 tablets (40 mg total) by mouth daily for 5 days, Disp: 10 tablet, Rfl: 0    albuterol (PROVENTIL HFA,VENTOLIN HFA) 90 mcg/act inhaler, Inhale 2 puffs every 4 (four) hours as needed (Patient not taking: Reported on 9/23/2024), Disp: , Rfl:     ibuprofen (MOTRIN) 600 mg tablet, Take 1 tablet by mouth every 6 (six) hours as needed for mild pain (Patient not taking: Reported on 2/11/2025), Disp: 20 tablet, Rfl: 0    methocarbamol (ROBAXIN) 500 mg tablet, Take 1 tablet (500 mg total) by mouth 2 (two) times a day as needed for muscle spasms (Patient not taking: Reported on 9/23/2024), Disp: 20 tablet, Rfl: 0    Multiple Vitamin (MULTIVITAMIN) tablet, Take 1 tablet by mouth daily (Patient not taking: Reported on 2/11/2025), Disp: , Rfl:     Current Allergies     Allergies as of 02/11/2025    (No Known Allergies)            The following portions of  "the patient's history were reviewed and updated as appropriate: allergies, current medications, past family history, past medical history, past social history, past surgical history and problem list.     Past Medical History:   Diagnosis Date    Medical history non-contributory        Past Surgical History:   Procedure Laterality Date    HAND SURGERY      NO PAST SURGERIES         Family History   Problem Relation Age of Onset    No Known Problems Father     Thyroid cancer Sister     Heart disease Maternal Grandmother     Heart disease Maternal Grandfather     Cancer Paternal Grandmother         throat    No Known Problems Paternal Grandfather     Heart disease Family          Medications have been verified.        Objective   /86   Pulse 74   Temp 97.5 °F (36.4 °C) (Temporal)   Resp 18   Ht 6' 1\" (1.854 m)   Wt 112 kg (246 lb 9.6 oz)   SpO2 97%   BMI 32.53 kg/m²        Physical Exam     Physical Exam  Vitals and nursing note reviewed.   Constitutional:       Appearance: Normal appearance. He is not ill-appearing, toxic-appearing or diaphoretic.   HENT:      Head: Normocephalic.      Right Ear: Tympanic membrane, ear canal and external ear normal.      Left Ear: Tympanic membrane, ear canal and external ear normal.      Nose: Congestion present.      Mouth/Throat:      Mouth: Mucous membranes are moist.      Pharynx: Posterior oropharyngeal erythema present.   Eyes:      Extraocular Movements: Extraocular movements intact.      Pupils: Pupils are equal, round, and reactive to light.   Cardiovascular:      Rate and Rhythm: Normal rate.      Pulses: Normal pulses.      Heart sounds: Normal heart sounds.   Pulmonary:      Effort: Pulmonary effort is normal. No respiratory distress.      Breath sounds: Normal breath sounds. No stridor. No wheezing, rhonchi or rales.   Chest:      Chest wall: No tenderness.   Musculoskeletal:         General: Normal range of motion.      Cervical back: Normal range of " motion.   Lymphadenopathy:      Cervical: No cervical adenopathy.   Skin:     General: Skin is warm and dry.      Capillary Refill: Capillary refill takes less than 2 seconds.   Neurological:      General: No focal deficit present.      Mental Status: He is alert and oriented to person, place, and time.   Psychiatric:         Mood and Affect: Mood normal.

## 2025-02-18 ENCOUNTER — OFFICE VISIT (OUTPATIENT)
Dept: FAMILY MEDICINE CLINIC | Facility: CLINIC | Age: 30
End: 2025-02-18
Payer: OTHER GOVERNMENT

## 2025-02-18 VITALS
SYSTOLIC BLOOD PRESSURE: 124 MMHG | OXYGEN SATURATION: 99 % | WEIGHT: 247.8 LBS | HEIGHT: 73 IN | HEART RATE: 79 BPM | BODY MASS INDEX: 32.84 KG/M2 | TEMPERATURE: 97.8 F | DIASTOLIC BLOOD PRESSURE: 82 MMHG

## 2025-02-18 DIAGNOSIS — E66.9 OBESITY (BMI 30-39.9): ICD-10-CM

## 2025-02-18 DIAGNOSIS — Z13.6 SCREENING FOR CARDIOVASCULAR CONDITION: ICD-10-CM

## 2025-02-18 DIAGNOSIS — Z13.1 SCREENING FOR DIABETES MELLITUS: ICD-10-CM

## 2025-02-18 DIAGNOSIS — G47.33 OBSTRUCTIVE SLEEP APNEA SYNDROME: ICD-10-CM

## 2025-02-18 DIAGNOSIS — R03.0 ELEVATED BLOOD PRESSURE READING WITHOUT DIAGNOSIS OF HYPERTENSION: Primary | ICD-10-CM

## 2025-02-18 PROCEDURE — 99214 OFFICE O/P EST MOD 30 MIN: CPT | Performed by: FAMILY MEDICINE

## 2025-02-18 NOTE — PROGRESS NOTES
Name: Rolly Hernandez III      : 1995      MRN: [id number]  Encounter Provider: Maribell López MD  Encounter Date: 2025   Encounter department: [facility]  :  Assessment & Plan  Elevated blood pressure reading without diagnosis of hypertension  Normal in office- was elevated at urgent care when sick       Obesity (BMI 30-39.9)  Discussed diet and exercise  Will get labs  Discussed diet and exercise  Gave handout on weight loss meds, he checked and wegovy is covered by his insurance  Orders:    Hemoglobin A1C; Future    Screening for diabetes mellitus    Orders:    Hemoglobin A1C; Future    Screening for cardiovascular condition    Orders:    Hemoglobin A1C; Future    Obstructive sleep apnea syndrome  stable              History of Present Illness   HPI  29yom here for BP follow up  Was seen in urgent care while sick and had /86- told to follow up  BP good today, has had mostly within normal range Bps since I've been seeing him  Pt has gained approx 20 pounds over past year  States he is eating healthy, exercising- started doing more cardio and increasing protein, lifting less  Discussed lifting and diet with pt  Has not gotten basic labs from [facility] exam 2023, will add A1c  Pt possibly interested in weight loss med- states insurance covers wegovy  Discussed meds and side effects- gave handout for him  Will get labs and let me know if he is wanting to start  Will follow up in 2 months for BP and weight check  Review of Systems   Constitutional: Negative.  Negative for chills and fever.   HENT: Negative.  Negative for ear pain and sore throat.    Eyes:  Negative for pain and visual disturbance.   Respiratory: Negative.  Negative for cough and shortness of breath.    Cardiovascular: Negative.  Negative for chest pain and palpitations.   Gastrointestinal: Negative.  Negative for abdominal pain and vomiting.   Genitourinary: Negative.  Negative for dysuria and hematuria.  "  Musculoskeletal: Negative.  Negative for arthralgias and back pain.   Skin: Negative.  Negative for color change and rash.   Neurological: Negative.  Negative for seizures and syncope.   Psychiatric/Behavioral: Negative.     All other systems reviewed and are negative.      Objective   /82 (BP Location: Left arm, Patient Position: Sitting, Cuff Size: Large)   Pulse 79   Temp 97.8 °F (36.6 °C) (Temporal)   Ht 6' 1\" (1.854 m)   Wt 112 kg (247 lb 12.8 oz)   SpO2 99%   BMI 32.69 kg/m²      Physical Exam  Vitals and nursing note reviewed.   Constitutional:       Appearance: Normal appearance.   HENT:      Head: Normocephalic.   Eyes:      Extraocular Movements: Extraocular movements intact.   Cardiovascular:      Rate and Rhythm: Normal rate and regular rhythm.      Pulses: Normal pulses.      Heart sounds: Normal heart sounds.   Pulmonary:      Effort: Pulmonary effort is normal. No respiratory distress.   Musculoskeletal:      Cervical back: Neck supple.   Neurological:      General: No focal deficit present.      Mental Status: He is alert and oriented to person, place, and time.   Psychiatric:         Mood and Affect: Mood normal.         Behavior: Behavior normal.         "

## 2025-02-19 ENCOUNTER — APPOINTMENT (OUTPATIENT)
Dept: LAB | Facility: CLINIC | Age: 30
End: 2025-02-19
Payer: OTHER GOVERNMENT

## 2025-02-19 DIAGNOSIS — Z11.59 NEED FOR HEPATITIS C SCREENING TEST: ICD-10-CM

## 2025-02-19 DIAGNOSIS — R06.83 SNORING: ICD-10-CM

## 2025-02-19 DIAGNOSIS — R40.0 DAYTIME SLEEPINESS: ICD-10-CM

## 2025-02-19 DIAGNOSIS — E55.9 VITAMIN D DEFICIENCY: ICD-10-CM

## 2025-02-19 DIAGNOSIS — Z00.00 ANNUAL PHYSICAL EXAM: ICD-10-CM

## 2025-02-19 DIAGNOSIS — Z11.4 SCREENING FOR HIV (HUMAN IMMUNODEFICIENCY VIRUS): ICD-10-CM

## 2025-02-19 DIAGNOSIS — E66.9 OBESITY (BMI 30-39.9): ICD-10-CM

## 2025-02-19 DIAGNOSIS — R06.81 WITNESSED EPISODE OF APNEA: ICD-10-CM

## 2025-02-19 DIAGNOSIS — Z13.6 SCREENING FOR CARDIOVASCULAR CONDITION: ICD-10-CM

## 2025-02-19 DIAGNOSIS — Z13.1 SCREENING FOR DIABETES MELLITUS: ICD-10-CM

## 2025-02-19 LAB
ALBUMIN SERPL BCG-MCNC: 4.3 G/DL (ref 3.5–5)
ALP SERPL-CCNC: 73 U/L (ref 34–104)
ALT SERPL W P-5'-P-CCNC: 68 U/L (ref 7–52)
ANION GAP SERPL CALCULATED.3IONS-SCNC: 10 MMOL/L (ref 4–13)
AST SERPL W P-5'-P-CCNC: 37 U/L (ref 13–39)
BASOPHILS # BLD AUTO: 0.02 THOUSANDS/ΜL (ref 0–0.1)
BASOPHILS NFR BLD AUTO: 0 % (ref 0–1)
BILIRUB SERPL-MCNC: 0.49 MG/DL (ref 0.2–1)
BUN SERPL-MCNC: 16 MG/DL (ref 5–25)
CALCIUM SERPL-MCNC: 9 MG/DL (ref 8.4–10.2)
CHLORIDE SERPL-SCNC: 103 MMOL/L (ref 96–108)
CHOLEST SERPL-MCNC: 170 MG/DL (ref ?–200)
CO2 SERPL-SCNC: 28 MMOL/L (ref 21–32)
CREAT SERPL-MCNC: 0.74 MG/DL (ref 0.6–1.3)
EOSINOPHIL # BLD AUTO: 0.35 THOUSAND/ΜL (ref 0–0.61)
EOSINOPHIL NFR BLD AUTO: 5 % (ref 0–6)
ERYTHROCYTE [DISTWIDTH] IN BLOOD BY AUTOMATED COUNT: 13.1 % (ref 11.6–15.1)
GFR SERPL CREATININE-BSD FRML MDRD: 126 ML/MIN/1.73SQ M
GLUCOSE P FAST SERPL-MCNC: 116 MG/DL (ref 65–99)
HCT VFR BLD AUTO: 42 % (ref 36.5–49.3)
HDLC SERPL-MCNC: 43 MG/DL
HGB BLD-MCNC: 13.5 G/DL (ref 12–17)
IMM GRANULOCYTES # BLD AUTO: 0.07 THOUSAND/UL (ref 0–0.2)
IMM GRANULOCYTES NFR BLD AUTO: 1 % (ref 0–2)
LDLC SERPL CALC-MCNC: 84 MG/DL (ref 0–100)
LYMPHOCYTES # BLD AUTO: 2.83 THOUSANDS/ΜL (ref 0.6–4.47)
LYMPHOCYTES NFR BLD AUTO: 44 % (ref 14–44)
MCH RBC QN AUTO: 26.5 PG (ref 26.8–34.3)
MCHC RBC AUTO-ENTMCNC: 32.1 G/DL (ref 31.4–37.4)
MCV RBC AUTO: 82 FL (ref 82–98)
MONOCYTES # BLD AUTO: 0.45 THOUSAND/ΜL (ref 0.17–1.22)
MONOCYTES NFR BLD AUTO: 7 % (ref 4–12)
NEUTROPHILS # BLD AUTO: 2.79 THOUSANDS/ΜL (ref 1.85–7.62)
NEUTS SEG NFR BLD AUTO: 43 % (ref 43–75)
NONHDLC SERPL-MCNC: 127 MG/DL
NRBC BLD AUTO-RTO: 0 /100 WBCS
PLATELET # BLD AUTO: 279 THOUSANDS/UL (ref 149–390)
PMV BLD AUTO: 9.9 FL (ref 8.9–12.7)
POTASSIUM SERPL-SCNC: 4.3 MMOL/L (ref 3.5–5.3)
PROT SERPL-MCNC: 7.6 G/DL (ref 6.4–8.4)
RBC # BLD AUTO: 5.1 MILLION/UL (ref 3.88–5.62)
SODIUM SERPL-SCNC: 141 MMOL/L (ref 135–147)
TRIGL SERPL-MCNC: 216 MG/DL (ref ?–150)
WBC # BLD AUTO: 6.51 THOUSAND/UL (ref 4.31–10.16)

## 2025-02-19 PROCEDURE — 87389 HIV-1 AG W/HIV-1&-2 AB AG IA: CPT

## 2025-02-19 PROCEDURE — 80061 LIPID PANEL: CPT

## 2025-02-19 PROCEDURE — 83036 HEMOGLOBIN GLYCOSYLATED A1C: CPT

## 2025-02-19 PROCEDURE — 36415 COLL VENOUS BLD VENIPUNCTURE: CPT

## 2025-02-19 PROCEDURE — 85025 COMPLETE CBC W/AUTO DIFF WBC: CPT

## 2025-02-19 PROCEDURE — 82306 VITAMIN D 25 HYDROXY: CPT

## 2025-02-19 PROCEDURE — 80053 COMPREHEN METABOLIC PANEL: CPT

## 2025-02-19 PROCEDURE — 84443 ASSAY THYROID STIM HORMONE: CPT

## 2025-02-19 PROCEDURE — 86803 HEPATITIS C AB TEST: CPT

## 2025-02-20 LAB
25(OH)D3 SERPL-MCNC: 23.5 NG/ML (ref 30–100)
EST. AVERAGE GLUCOSE BLD GHB EST-MCNC: 128 MG/DL
HBA1C MFR BLD: 6.1 %
HCV AB SER QL: NORMAL
HIV 1+2 AB+HIV1 P24 AG SERPL QL IA: NORMAL
TSH SERPL DL<=0.05 MIU/L-ACNC: 1.47 UIU/ML (ref 0.45–4.5)

## 2025-03-09 ENCOUNTER — RESULTS FOLLOW-UP (OUTPATIENT)
Dept: FAMILY MEDICINE CLINIC | Facility: CLINIC | Age: 30
End: 2025-03-09

## 2025-04-09 ENCOUNTER — OFFICE VISIT (OUTPATIENT)
Dept: SLEEP CENTER | Facility: CLINIC | Age: 30
End: 2025-04-09
Payer: OTHER GOVERNMENT

## 2025-04-09 VITALS
SYSTOLIC BLOOD PRESSURE: 148 MMHG | WEIGHT: 249 LBS | BODY MASS INDEX: 33 KG/M2 | HEIGHT: 73 IN | DIASTOLIC BLOOD PRESSURE: 84 MMHG | RESPIRATION RATE: 16 BRPM | OXYGEN SATURATION: 96 % | TEMPERATURE: 98 F | HEART RATE: 72 BPM

## 2025-04-09 DIAGNOSIS — E66.811 CLASS 1 OBESITY: ICD-10-CM

## 2025-04-09 DIAGNOSIS — G47.33 OSA (OBSTRUCTIVE SLEEP APNEA): Primary | ICD-10-CM

## 2025-04-09 PROCEDURE — 99213 OFFICE O/P EST LOW 20 MIN: CPT

## 2025-04-09 NOTE — PROGRESS NOTES
[facility]  Sleep Medicine Follow Up/Established Patient    PATIENT NAME: Rolly Hernandez III  DATE OF SERVICE: April 9, 2025  DATE OF LAST VISIT: 12/11/2024    ASSESSMENT/PLAN:  Rolly Hernandez III is a 30 y.o. male with PMHx of RADHA on CPAP and obesity who returns to the office for follow up.    RADHA (obstructive sleep apnea)  - The patient has a history of moderate RADHA exacerbated to severe during REM sleep diagnosed on PSG in 2024 (AHI 18.5, supine AHI 18.5, REM AHI 34.5, O2 mendoza 75%, TST <90% 7.0 min) and is currently on auto CPAP 10-20 cmH2O.  He reports that the increased pressures have been beneficia, but that his mask leak has been worse lately.  - Therapy and compliance data reviewed: residual AHI 2.6, compliance 97% and mask leak is elevated.  - Will narrow pressures to APAP 11-16 cmH2O with a full-face mask.  - Refill of supplies will be sent to the patient's DME.  - Explained the importance of keeping the machine clean, and that they should be eligible for refills of supplies every 3-6 months depending on insurance.  We also discussed the insurance compliance requirements.  - Encouraged healthy lifestyle with adequate sleep (7-9 hours per night), healthy balanced diet and routine exercise.  Explained the importance of avoiding driving while drowsy.  - Follow-up in 6 months.  -     PAP DME Pressure Change  -     PAP DME Resupply/Reorder    Class 1 obesity  - Weight loss is recommended.    ________________________________________________________________________________________________    Interval History: Rolly Hernandez III is a 30 y.o. male with PMHx of RADHA on CPAP and obesity who returns to the office for follow up.  He reports that since increasing his pressures he has felt slightly more rested, but that now he is having increased leak which is causing him to wake up at night at times.  He states his snoring has resolved and he denies any issues with the fit of his mask.  He  otherwise denies aerophagia, pressure intolerance, rainout or excessive dry mouth.  He is routinely getting supplies and is keeping the device clean on a regular basis.    PAP History  Sleep Apnea Type: RADHA  Most Recent AHI: 18.5 in 2024  Treatment: APAP     DME: [facility]    Current PAP Settings: 10-20 cmH2O  Compliance Data: 97%, see below    Mask Type: full-face (AirFit F20)  PAP Issues: mask leaks  Uses Chin Strap: No  Uses Ramp Function: Yes   Uses Humidity: Yes     There is a perceived benefit by the patient: feels more rested with CPAP use  Observers report no longer has snoring with APAP use.    Prior History: The patient has been following with [facility] sleep medicine since 9/2024 after he had a PSG which showed moderate RADHA (AHI 18.5, supine AHI 18.5, REM AHI 34.5, O2 mendoza 75%, TST <90% 7.0 min). At his initial visit he was ordered auto CPAP 5-15 cmH2O and noted significant daytime symptoms.  At initial compliance follow-up he had adjusted to use of the CPAP; however, he reported still waking up 1-2 times night for unclear reasons as well as continued snoring and plan was to increase his settings to 10-20 cmH2O.  He returns today for continued follow-up.    ESS: Total score: (Patient-Rptd) (P) 9/24  Greater or equal to 10 is positive for excessive daytime sleepiness  Pertinent Meds: None    Sleep-Wake Schedule:  Bedtime: 2130  Wake Time: 0530  Difficulty Falling Asleep: No  Avg Number of Awakenings: 1-2x a night  Cause of Awakenings: mask leak or unclear  Weekend Sleep Schedule: 9928-6716  Naps: denies    Avg TST per 24 hours: 7-7.5 hours    Past Treatments:  APAP 5-15 cmH2O    Prior Sleep Studies:  PSG -- 8/28/2024 (Wt 229.0 lbs)  AHI - 18.5  Sup AHI - 18.5  REM AHI - 34.5  O2 Mendoza - 75.0%  TST < 90% - 7.0 min  PLMI - 0.3  PLM Zeb - 0.2    Other Relevant Labs and Studies:  Therapy and Compliance Data -- 3/10/2025 - 4/8/2025      Past Medical History:   Diagnosis Date    Medical history  "non-contributory      Past Surgical History:   Procedure Laterality Date    HAND SURGERY      NO PAST SURGERIES       Patient Active Problem List   Diagnosis    Viral meningitis    Snoring    RADHA (obstructive sleep apnea)    Obstructive sleep apnea syndrome    Daytime sleepiness    Witnessed episode of apnea    Class 1 obesity due to excess calories without serious comorbidity with body mass index (BMI) of 31.0 to 31.9 in adult     Allergies as of 04/09/2025    (No Known Allergies)     REVIEW OF SYSTEMS:  Review of Systems  10-point system review completed, all of which are negative except as mentioned above.    CURRENT MEDICATIONS:  Current Outpatient Medications   Medication Instructions    benzonatate (TESSALON) 200 mg, Oral, 3 times daily PRN     SOCIAL HISTORY:  Social History     Tobacco Use    Smoking status: Never    Smokeless tobacco: Never   Vaping Use    Vaping status: Never Used   Substance Use Topics    Alcohol use: Not Currently    Drug use: No     FAMILY HISTORY:  Family History   Problem Relation Age of Onset    No Known Problems Father     Thyroid cancer Sister     Heart disease Maternal Grandmother     Heart disease Maternal Grandfather     Cancer Paternal Grandmother         throat    No Known Problems Paternal Grandfather     Heart disease Family      PHYSICAL EXAMINATION:  Vital Signs:  /84 (BP Location: Left arm, Patient Position: Sitting, Cuff Size: Large)   Pulse 72   Temp 98 °F (36.7 °C) (Temporal)   Resp 16   Ht 6' 1\" (1.854 m)   Wt 113 kg (249 lb)   SpO2 96%   BMI 32.85 kg/m²   Body mass index is 32.85 kg/m².  Constitutional: NAD, well appearing, obese   Mental Status: AAOx3  Skin: Warm, dry, no rashes noted   Eyes: PERRL, normal conjunctiva  ENT: Nasal congestion absent, nasal valve incompetence absent.  Chest: RRR, +S1/S2, CTA B/L, no W/R/R, no M/R/G   Extremities: No digital clubbing or pedal edema      Bee Ng MD  Pulmonary-Critical Care and Sleep " "Medicine  04/09/25    Portions of the record may have been created with voice recognition software. Occasional wrong word or \"sound a like\" substitutions may have occurred due to the inherent limitations of voice recognition software. Please read the chart carefully and recognize, using context, where substitutions have occurred.   "

## 2025-04-09 NOTE — PATIENT INSTRUCTIONS
Continue PAP Therapy  - Continue APAP at 11-16 cmH2O.  Remember to clean your mask and equipment regularly, as directed.  You should be eligible for new supplies approximately every 3-6 months, depending on your insurance coverage. Contact your Durable Medical Equipment (DME) company for new supplies as needed.  Follow up in 6 months    Care and Maintenance  Headgear should be washed as needed. Daily inspection and weekly washings are recommended. Do not disassemble the straps. Machine wash in warm water, making sure to attach Velcro hooks and tabs before washing. Line dry or machine dry on a low setting.  Masks should be washed every day. Daily inspection is recommended. Leave the mask and tubing attached. Gently wash the mask with a soft cloth using warm water and mild detergent, concentrating on the mask cushion flaps. DO NOT use alcohol or bleach. Rinse thoroughly and air dry.  Tubing and headgear should be washed weekly. Daily inspection is recommended. Wash in warm water and mild detergent and rinse thoroughly. Hook the tubing to the machine and blow until dry.  Humidifier should be washed daily and filled with DISTILLED water before use. Wash with warm water and mild detergent. Disinfect weekly by soaking with a solution of 1 part white vinegar and 3 parts water for 30 minutes. Rinse thoroughly and air dry.  Disposable filters should be replaced once a month. Wash reusable foam filters with warm water and mild detergent at least once a month. Rinse thoroughly and dry with paper towels.  Avoid  that contain fragrance or conditioners, as these will leave a residue.  NEVER iron any soft goods.    Insurance Requirements  Your insurance requires a face-to-face follow up visit within a 31-90 day period after starting PAP therapy.  Your insurance requires compliance with your PAP device, which is at least 4 hours per night for 70% of the time. This must be done over a 30 day period and must occur within the  initial 31-90 day period after starting CPAP.  Your insurance also requires at least yearly follow ups to continue to pay for CPAP supplies.     PAP Supply Guidelines  Below are the guidelines for reordering your supplies. You will be responsible for your deductible, co payments, and out of pocket expenses.    Item Frequency   Nasal Mask (no headgear) 1 every 3 months   Nasal Mask Cushion 1 every 2 weeks   Full Face Mask (no headgear) 1 every 3 months   Full Face Mask Cushion 1 every month   Nasal Pillows 1 every 2 weeks   Headgear 1 every 6 months   hin Strap 1 every 6 months   francisco 1 every 3 months   Filters: Reusable 1 every 6 months   Filters: Disposable 1 every 2 weeks   Humidifier Chamber(disposable) 1 every 6 months

## 2025-04-10 ENCOUNTER — TELEPHONE (OUTPATIENT)
Dept: SLEEP CENTER | Facility: CLINIC | Age: 30
End: 2025-04-10

## 2025-04-14 ENCOUNTER — HOSPITAL ENCOUNTER (EMERGENCY)
Facility: HOSPITAL | Age: 30
Discharge: HOME | End: 2025-04-14
Attending: EMERGENCY MEDICINE
Payer: OTHER GOVERNMENT

## 2025-04-14 VITALS
WEIGHT: 236.77 LBS | TEMPERATURE: 97.9 F | BODY MASS INDEX: 31.38 KG/M2 | HEIGHT: 73 IN | DIASTOLIC BLOOD PRESSURE: 108 MMHG | SYSTOLIC BLOOD PRESSURE: 157 MMHG | HEART RATE: 68 BPM | OXYGEN SATURATION: 99 % | RESPIRATION RATE: 18 BRPM

## 2025-04-14 DIAGNOSIS — S61.213A LACERATION OF LEFT MIDDLE FINGER: Primary | ICD-10-CM

## 2025-04-14 PROCEDURE — 99284 EMERGENCY DEPT VISIT MOD MDM: CPT | Performed by: EMERGENCY MEDICINE

## 2025-04-14 PROCEDURE — 90471 IMMUNIZATION ADMIN: CPT

## 2025-04-14 PROCEDURE — 12001 RPR S/N/AX/GEN/TRNK 2.5CM/<: CPT | Performed by: EMERGENCY MEDICINE

## 2025-04-14 PROCEDURE — 99282 EMERGENCY DEPT VISIT SF MDM: CPT

## 2025-04-14 PROCEDURE — 90715 TDAP VACCINE 7 YRS/> IM: CPT | Performed by: EMERGENCY MEDICINE

## 2025-04-14 RX ADMIN — TETANUS TOXOID, REDUCED DIPHTHERIA TOXOID AND ACELLULAR PERTUSSIS VACCINE, ADSORBED 0.5 ML: 5; 2.5; 8; 8; 2.5 SUSPENSION INTRAMUSCULAR at 17:46

## 2025-04-14 NOTE — ED PROVIDER NOTES
ED Disposition       None          Assessment & Plan       Medical Decision Making  Laceration was cleaned in the ED and covered with skin glue and nonadherent pad and dressing.  Laceration was very superficial not requiring suture repair at this time advised local wound care tetanus updated. return precautions and anticipatory guidance discussed.      Risk  Prescription drug management.             Medications   tetanus-diphtheria-acellular pertussis (BOOSTRIX) IM injection 0.5 mL (has no administration in time range)       ED Risk Strat Scores                    No data recorded                            History of Present Illness       Chief Complaint   Patient presents with    Finger Laceration     Pt was cleaning bathroom and cut left middle finger on serrated edge of a tile. States it was rusted looking. Not sure of last tetanus, okay with getting it again. Bleeding controlled at this time no blood thinners       Past Medical History:   Diagnosis Date    Medical history non-contributory       Past Surgical History:   Procedure Laterality Date    HAND SURGERY      NO PAST SURGERIES        Family History   Problem Relation Age of Onset    No Known Problems Father     Thyroid cancer Sister     Heart disease Maternal Grandmother     Heart disease Maternal Grandfather     Cancer Paternal Grandmother         throat    No Known Problems Paternal Grandfather     Heart disease Family       Social History     Tobacco Use    Smoking status: Never    Smokeless tobacco: Never   Vaping Use    Vaping status: Never Used   Substance Use Topics    Alcohol use: Not Currently    Drug use: No      E-Cigarette/Vaping    E-Cigarette Use Never User       E-Cigarette/Vaping Substances    Nicotine No     THC No     CBD No     Flavoring No     Other No     Unknown No       I have reviewed and agree with the history as documented.     Patient is a 30-year-old male presents the emergency department due to laceration to the left middle  finger which he cut on the edge of a tile while cleaning a bathroom.  Bleeding controlled no foreign body no other injury tetanus not up-to-date.        History provided by:  Patient  Finger Laceration      Review of Systems   Skin:  Positive for wound.   All other systems reviewed and are negative.          Objective       ED Triage Vitals [04/14/25 1721]   Temperature Pulse Blood Pressure Respirations SpO2 Patient Position - Orthostatic VS   97.9 °F (36.6 °C) 68 (!) 157/108 18 99 % Sitting      Temp Source Heart Rate Source BP Location FiO2 (%) Pain Score    Temporal Monitor Right arm -- --      Vitals      Date and Time Temp Pulse SpO2 Resp BP Pain Score FACES Pain Rating User   04/14/25 1721 97.9 °F (36.6 °C) 68 99 % 18 157/108 -- -- SR            Physical Exam  Vitals and nursing note reviewed.   Constitutional:       General: He is not in acute distress.     Appearance: Normal appearance.   HENT:      Head: Normocephalic and atraumatic.      Nose: Nose normal.   Eyes:      Conjunctiva/sclera: Conjunctivae normal.   Pulmonary:      Effort: Pulmonary effort is normal. No respiratory distress.   Musculoskeletal:      Left hand: Laceration present. No deformity. Normal range of motion. There is no disruption of two-point discrimination. Normal capillary refill. Normal pulse.      Comments: 2.5 cm linear superficial laceration on pad over distal phalanx of left middle finger bleeding controlled no foreign body   Skin:     General: Skin is dry.   Neurological:      General: No focal deficit present.      Mental Status: He is alert and oriented to person, place, and time.         Results Reviewed       None            No orders to display       Universal Protocol:  procedure performed by consultantConsent: Verbal consent obtained.  Risks and benefits: risks, benefits and alternatives were discussed  Consent given by: patient  Timeout called at: 4/14/2025 5:37 PM.  Patient understanding: patient states understanding  of the procedure being performed  Patient identity confirmed: verbally with patient  Laceration repair    Date/Time: 4/14/2025 5:37 PM    Performed by: Nitesh Middleton DO  Authorized by: Nitesh Middleton DO  Body area: upper extremity  Location details: left long finger  Laceration length: 2 cm  Foreign bodies: no foreign bodies  Tendon involvement: none  Nerve involvement: none      Procedure Details:  Amount of cleaning: standard  Skin closure: glue  Approximation difficulty: simple  Dressing: gauze roll  Patient tolerance: patient tolerated the procedure well with no immediate complications          ED Medication and Procedure Management   Prior to Admission Medications   Prescriptions Last Dose Informant Patient Reported? Taking?   benzonatate (TESSALON) 200 MG capsule  Self No No   Sig: Take 1 capsule (200 mg total) by mouth 3 (three) times a day as needed for cough   Patient not taking: Reported on 4/9/2025      Facility-Administered Medications: None     Patient's Medications   Discharge Prescriptions    No medications on file     No discharge procedures on file.  ED SEPSIS DOCUMENTATION            Nitesh Middleton DO  04/14/25 1735

## 2025-04-22 ENCOUNTER — OFFICE VISIT (OUTPATIENT)
Dept: FAMILY MEDICINE CLINIC | Facility: CLINIC | Age: 30
End: 2025-04-22
Payer: OTHER GOVERNMENT

## 2025-04-22 VITALS
DIASTOLIC BLOOD PRESSURE: 70 MMHG | HEART RATE: 77 BPM | SYSTOLIC BLOOD PRESSURE: 118 MMHG | BODY MASS INDEX: 32.47 KG/M2 | HEIGHT: 73 IN | TEMPERATURE: 97.6 F | OXYGEN SATURATION: 98 % | WEIGHT: 245 LBS

## 2025-04-22 DIAGNOSIS — R03.0 ELEVATED BLOOD PRESSURE READING WITHOUT DIAGNOSIS OF HYPERTENSION: Primary | ICD-10-CM

## 2025-04-22 PROCEDURE — 99213 OFFICE O/P EST LOW 20 MIN: CPT | Performed by: FAMILY MEDICINE

## 2025-04-22 NOTE — PROGRESS NOTES
"Name: Rolly Hernandez III      : 1995      MRN: [id number]  Encounter Provider: Maribell López MD  Encounter Date: 2025   Encounter department: [place] [facility] [facility]  :  Assessment & Plan  Elevated blood pressure reading without diagnosis of hypertension  BP much improved  Elevated in urgent care again  Will monitor at home when possible              History of Present Illness   HPI  30yom here for follow up BP  BP controlled today  Was in urgent care for laceration recently and BP elevated  Has started working out at home and feeling much better  Encouraged him to continue  Will see back for well exam in August  Review of Systems   Constitutional: Negative.  Negative for chills and fever.   HENT: Negative.  Negative for ear pain and sore throat.    Eyes:  Negative for pain and visual disturbance.   Respiratory: Negative.  Negative for cough and shortness of breath.    Cardiovascular: Negative.  Negative for chest pain and palpitations.   Gastrointestinal: Negative.  Negative for abdominal pain and vomiting.   Genitourinary: Negative.  Negative for dysuria and hematuria.   Musculoskeletal: Negative.  Negative for arthralgias and back pain.   Skin: Negative.  Negative for color change and rash.   Neurological: Negative.  Negative for seizures and syncope.   Psychiatric/Behavioral: Negative.     All other systems reviewed and are negative.      Objective   /70 (BP Location: Left arm, Patient Position: Sitting, Cuff Size: Large)   Pulse 77   Temp 97.6 °F (36.4 °C) (Temporal)   Ht 6' 1\" (1.854 m)   Wt 111 kg (245 lb)   SpO2 98%   BMI 32.32 kg/m²      Physical Exam  Vitals and nursing note reviewed.   Constitutional:       Appearance: Normal appearance.   HENT:      Head: Normocephalic.   Eyes:      Extraocular Movements: Extraocular movements intact.      Pupils: Pupils are equal, round, and reactive to light.   Cardiovascular:      Rate and Rhythm: Normal rate and regular rhythm.      " Pulses: Normal pulses.      Heart sounds: Normal heart sounds.   Pulmonary:      Effort: Pulmonary effort is normal. No respiratory distress.      Breath sounds: Normal breath sounds. No wheezing.   Musculoskeletal:      Cervical back: Neck supple.   Neurological:      General: No focal deficit present.      Mental Status: He is alert and oriented to person, place, and time.   Psychiatric:         Mood and Affect: Mood normal.         Behavior: Behavior normal.

## 2025-06-13 ENCOUNTER — PATIENT MESSAGE (OUTPATIENT)
Dept: FAMILY MEDICINE CLINIC | Facility: CLINIC | Age: 30
End: 2025-06-13

## 2025-06-27 NOTE — PATIENT COMMUNICATION
Patient returned call. Scheduled patient for next available opening with Primary Care Provider in person for evaluation. He thanks us for our help!

## 2025-07-01 ENCOUNTER — OFFICE VISIT (OUTPATIENT)
Dept: FAMILY MEDICINE CLINIC | Facility: CLINIC | Age: 30
End: 2025-07-01
Payer: OTHER GOVERNMENT

## 2025-07-01 VITALS
HEART RATE: 78 BPM | HEIGHT: 73 IN | DIASTOLIC BLOOD PRESSURE: 88 MMHG | SYSTOLIC BLOOD PRESSURE: 132 MMHG | BODY MASS INDEX: 34.99 KG/M2 | WEIGHT: 264 LBS | TEMPERATURE: 97.6 F | OXYGEN SATURATION: 98 %

## 2025-07-01 DIAGNOSIS — R41.840 LACK OF CONCENTRATION: ICD-10-CM

## 2025-07-01 DIAGNOSIS — R03.0 ELEVATED BLOOD PRESSURE READING WITHOUT DIAGNOSIS OF HYPERTENSION: Primary | ICD-10-CM

## 2025-07-01 DIAGNOSIS — E66.9 OBESITY (BMI 30-39.9): ICD-10-CM

## 2025-07-01 DIAGNOSIS — G47.33 OBSTRUCTIVE SLEEP APNEA SYNDROME: ICD-10-CM

## 2025-07-01 DIAGNOSIS — F41.9 ANXIETY: ICD-10-CM

## 2025-07-01 PROCEDURE — 99214 OFFICE O/P EST MOD 30 MIN: CPT | Performed by: FAMILY MEDICINE

## 2025-07-01 NOTE — PROGRESS NOTES
Name: Rolly Hernandez III      : 1995      MRN: [id number]  Encounter Provider: Maribell López MD  Encounter Date: 2025   Encounter department: [facility]  :  Assessment & Plan  Elevated blood pressure reading without diagnosis of hypertension  Will return for BP check, initial BP elevated  Discussed diet and exercise       Obesity (BMI 30-39.9)  Discussed diet and exercise     Obstructive sleep apnea syndrome         Anxiety         Lack of concentration                Chief Complaint   Patient presents with    Medication Management     Discuss ADHD medications       History of Present Illness   HPI  30-year-old male here for follow-up and also questions regarding ADHD.  Patient has been seen for elevated blood pressure in the past.  Blood pressure initially elevated here but actually decreased after visit.  I will see him back for blood pressure check in a few weeks.  We did discuss diet and exercise as he does seem to have gained some weight over the past few months.  Patient is wondering how to be tested for ADHD.  In talking with him he feels he has trouble concentrating and staying on task at work.  Growing up.  He did have a IEP for reading and English but his grades were fine studying was fine, and he did well in college.  Patient does feel he has some anxiety but he has never been treated for this.  He does state that his mind races.  I did discuss formal testing with mental health provider.  I also discussed filling out questionnaires that we have in this office, me reviewing them, and then discussing them.  I discussed possibly treating the anxiety first to see if this helps since it does seem that he has overlying anxiety.  He is interested in doing the questionnaires and getting feedback from those.  He may also be interested in formal testing.  I told him that I will call him later this week regarding his paperwork after I review them.  I discussed possible treatment for  "his blood pressure.  I discussed that diet exercise and getting to a healthier weight will help this.  He will be back in 2 3 weeks for blood pressure check.  Review of Systems   Constitutional: Negative.  Negative for chills and fever.   HENT: Negative.  Negative for ear pain and sore throat.    Eyes:  Negative for pain and visual disturbance.   Respiratory: Negative.  Negative for cough and shortness of breath.    Cardiovascular: Negative.  Negative for chest pain and palpitations.   Gastrointestinal: Negative.  Negative for abdominal pain and vomiting.   Genitourinary: Negative.  Negative for dysuria and hematuria.   Musculoskeletal: Negative.  Negative for arthralgias and back pain.   Skin: Negative.  Negative for color change and rash.   Neurological: Negative.  Negative for seizures and syncope.   Psychiatric/Behavioral:  Positive for decreased concentration. The patient is nervous/anxious.    All other systems reviewed and are negative.      Objective   /88 (BP Location: Left arm, Patient Position: Sitting, Cuff Size: Large)   Pulse 78   Temp 97.6 °F (36.4 °C) (Temporal)   Ht 6' 1\" (1.854 m)   Wt 120 kg (264 lb)   SpO2 98%   BMI 34.83 kg/m²      Physical Exam  Vitals and nursing note reviewed.   Constitutional:       Appearance: Normal appearance.   HENT:      Head: Normocephalic.     Eyes:      Extraocular Movements: Extraocular movements intact.       Cardiovascular:      Rate and Rhythm: Normal rate and regular rhythm.      Pulses: Normal pulses.      Heart sounds: Normal heart sounds.   Pulmonary:      Effort: Pulmonary effort is normal. No respiratory distress.     Musculoskeletal:      Cervical back: Neck supple.     Neurological:      General: No focal deficit present.      Mental Status: He is alert and oriented to person, place, and time.     Psychiatric:         Mood and Affect: Mood normal.         Behavior: Behavior normal.       Administrative Statements   I have spent a total time of " 30 minutes in caring for this patient on the day of the visit/encounter including Diagnostic results, Risks and benefits of tx options, Instructions for management, Patient and family education, Risk factor reductions, Impressions, Counseling / Coordination of care, Documenting in the medical record, Reviewing/placing orders in the medical record (including tests, medications, and/or procedures), and Obtaining or reviewing history  .

## 2025-07-08 ENCOUNTER — PATIENT MESSAGE (OUTPATIENT)
Dept: FAMILY MEDICINE CLINIC | Facility: CLINIC | Age: 30
End: 2025-07-08

## 2025-07-08 DIAGNOSIS — F41.9 ANXIETY: Primary | ICD-10-CM

## 2025-07-08 DIAGNOSIS — R41.840 DIFFICULTY CONCENTRATING: ICD-10-CM

## 2025-07-08 RX ORDER — BUPROPION HYDROCHLORIDE 150 MG/1
TABLET ORAL
Qty: 60 TABLET | Refills: 0 | Status: SHIPPED | OUTPATIENT
Start: 2025-07-08

## 2025-07-08 NOTE — PROGRESS NOTES
Spoke with pt regarding his testing done last week  Scored borderline for ADHD  Pt does admit to anxiety  Part B he did not score significantly  Discussed wellbutrin anxiety and off label for ADHD  Pt is willing to try this  Script sent in

## 2025-07-28 DIAGNOSIS — R41.840 DIFFICULTY CONCENTRATING: ICD-10-CM

## 2025-07-28 DIAGNOSIS — F41.9 ANXIETY: ICD-10-CM

## 2025-07-30 RX ORDER — BUPROPION HYDROCHLORIDE 300 MG/1
300 TABLET ORAL EVERY MORNING
Qty: 90 TABLET | Refills: 0 | Status: SHIPPED | OUTPATIENT
Start: 2025-07-30

## 2025-08-12 ENCOUNTER — OFFICE VISIT (OUTPATIENT)
Dept: FAMILY MEDICINE CLINIC | Facility: CLINIC | Age: 30
End: 2025-08-12
Payer: OTHER GOVERNMENT